# Patient Record
Sex: FEMALE | Race: ASIAN | Employment: PART TIME | URBAN - METROPOLITAN AREA
[De-identification: names, ages, dates, MRNs, and addresses within clinical notes are randomized per-mention and may not be internally consistent; named-entity substitution may affect disease eponyms.]

---

## 2018-09-11 ENCOUNTER — HOSPITAL ENCOUNTER (OUTPATIENT)
Dept: MAMMOGRAPHY | Age: 48
Discharge: HOME | End: 2018-09-11

## 2018-09-11 DIAGNOSIS — Z12.31 VISIT FOR SCREENING MAMMOGRAM: ICD-10-CM

## 2018-10-04 ENCOUNTER — HOSPITAL ENCOUNTER (OUTPATIENT)
Dept: WOMENS IMAGING | Age: 48
Discharge: HOME | End: 2018-10-04
Payer: COMMERCIAL

## 2018-10-04 ENCOUNTER — HOSPITAL ENCOUNTER (OUTPATIENT)
Dept: ULTRASOUND IMAGING | Age: 48
Discharge: HOME | End: 2018-10-04
Payer: COMMERCIAL

## 2018-10-04 DIAGNOSIS — R92.8 ABNORMAL FINDING ON MAMMOGRAPHY: ICD-10-CM

## 2018-10-04 PROCEDURE — G0279 TOMOSYNTHESIS, MAMMO: HCPCS

## 2018-10-04 PROCEDURE — 76642 ULTRASOUND BREAST LIMITED: CPT

## 2021-10-22 ENCOUNTER — HOSPITAL ENCOUNTER (OUTPATIENT)
Dept: WOMENS IMAGING | Age: 51
Discharge: HOME | End: 2021-10-22
Payer: COMMERCIAL

## 2021-10-22 VITALS — HEIGHT: 62 IN | WEIGHT: 162 LBS | BODY MASS INDEX: 29.81 KG/M2

## 2021-10-22 DIAGNOSIS — R92.8 ABNORMAL MAMMOGRAM OF BOTH BREASTS: ICD-10-CM

## 2021-10-22 PROCEDURE — G0279 TOMOSYNTHESIS, MAMMO: HCPCS

## 2023-09-21 ENCOUNTER — OFFICE VISIT (OUTPATIENT)
Dept: FAMILY MEDICINE CLINIC | Age: 53
End: 2023-09-21
Payer: COMMERCIAL

## 2023-09-21 VITALS
DIASTOLIC BLOOD PRESSURE: 74 MMHG | OXYGEN SATURATION: 98 % | HEIGHT: 65 IN | SYSTOLIC BLOOD PRESSURE: 112 MMHG | TEMPERATURE: 98.1 F | BODY MASS INDEX: 27.32 KG/M2 | HEART RATE: 81 BPM | WEIGHT: 164 LBS

## 2023-09-21 DIAGNOSIS — Z01.419 WELL WOMAN EXAM WITH ROUTINE GYNECOLOGICAL EXAM: ICD-10-CM

## 2023-09-21 DIAGNOSIS — Z00.00 ANNUAL PHYSICAL EXAM: Primary | ICD-10-CM

## 2023-09-21 DIAGNOSIS — Z12.31 ENCOUNTER FOR SCREENING MAMMOGRAM FOR MALIGNANT NEOPLASM OF BREAST: ICD-10-CM

## 2023-09-21 PROBLEM — E55.9 VITAMIN D DEFICIENCY: Status: ACTIVE | Noted: 2017-08-18

## 2023-09-21 PROCEDURE — 90674 CCIIV4 VAC NO PRSV 0.5 ML IM: CPT | Performed by: FAMILY MEDICINE

## 2023-09-21 PROCEDURE — 90472 IMMUNIZATION ADMIN EACH ADD: CPT | Performed by: FAMILY MEDICINE

## 2023-09-21 PROCEDURE — 90715 TDAP VACCINE 7 YRS/> IM: CPT | Performed by: FAMILY MEDICINE

## 2023-09-21 PROCEDURE — 99396 PREV VISIT EST AGE 40-64: CPT | Performed by: FAMILY MEDICINE

## 2023-09-21 PROCEDURE — 90471 IMMUNIZATION ADMIN: CPT | Performed by: FAMILY MEDICINE

## 2023-09-21 RX ORDER — IBUPROFEN 800 MG/1
1 TABLET ORAL 3 TIMES DAILY PRN
COMMUNITY
Start: 2021-10-27

## 2023-09-21 RX ORDER — CETIRIZINE HYDROCHLORIDE 10 MG/1
10 TABLET ORAL DAILY
COMMUNITY

## 2023-09-21 RX ORDER — FERROUS SULFATE 325(65) MG
325 TABLET ORAL DAILY
COMMUNITY

## 2023-09-21 RX ORDER — DIPHENHYDRAMINE HCL 25 MG
25 TABLET ORAL 2 TIMES DAILY
COMMUNITY

## 2023-09-21 SDOH — ECONOMIC STABILITY: FOOD INSECURITY: WITHIN THE PAST 12 MONTHS, YOU WORRIED THAT YOUR FOOD WOULD RUN OUT BEFORE YOU GOT MONEY TO BUY MORE.: NEVER TRUE

## 2023-09-21 SDOH — ECONOMIC STABILITY: INCOME INSECURITY: HOW HARD IS IT FOR YOU TO PAY FOR THE VERY BASICS LIKE FOOD, HOUSING, MEDICAL CARE, AND HEATING?: NOT HARD AT ALL

## 2023-09-21 SDOH — ECONOMIC STABILITY: FOOD INSECURITY: WITHIN THE PAST 12 MONTHS, THE FOOD YOU BOUGHT JUST DIDN'T LAST AND YOU DIDN'T HAVE MONEY TO GET MORE.: NEVER TRUE

## 2023-09-21 SDOH — ECONOMIC STABILITY: HOUSING INSECURITY
IN THE LAST 12 MONTHS, WAS THERE A TIME WHEN YOU DID NOT HAVE A STEADY PLACE TO SLEEP OR SLEPT IN A SHELTER (INCLUDING NOW)?: NO

## 2023-09-21 ASSESSMENT — PATIENT HEALTH QUESTIONNAIRE - PHQ9
2. FEELING DOWN, DEPRESSED OR HOPELESS: 0
SUM OF ALL RESPONSES TO PHQ QUESTIONS 1-9: 0
SUM OF ALL RESPONSES TO PHQ9 QUESTIONS 1 & 2: 0
1. LITTLE INTEREST OR PLEASURE IN DOING THINGS: 0
SUM OF ALL RESPONSES TO PHQ QUESTIONS 1-9: 0

## 2023-09-22 DIAGNOSIS — Z00.00 ANNUAL PHYSICAL EXAM: ICD-10-CM

## 2023-09-22 LAB
ALBUMIN SERPL-MCNC: 4.5 G/DL (ref 3.4–5)
ALBUMIN/GLOB SERPL: 1.7 {RATIO} (ref 1.1–2.2)
ALP SERPL-CCNC: 53 U/L (ref 40–129)
ALT SERPL-CCNC: 19 U/L (ref 10–40)
ANION GAP SERPL CALCULATED.3IONS-SCNC: 11 MMOL/L (ref 3–16)
AST SERPL-CCNC: 19 U/L (ref 15–37)
BASOPHILS # BLD: 0.1 K/UL (ref 0–0.2)
BASOPHILS NFR BLD: 0.9 %
BILIRUB SERPL-MCNC: 0.3 MG/DL (ref 0–1)
BUN SERPL-MCNC: 11 MG/DL (ref 7–20)
CALCIUM SERPL-MCNC: 9.2 MG/DL (ref 8.3–10.6)
CHLORIDE SERPL-SCNC: 105 MMOL/L (ref 99–110)
CHOLEST SERPL-MCNC: 234 MG/DL (ref 0–199)
CO2 SERPL-SCNC: 26 MMOL/L (ref 21–32)
CREAT SERPL-MCNC: 0.7 MG/DL (ref 0.6–1.1)
DEPRECATED RDW RBC AUTO: 14.5 % (ref 12.4–15.4)
EOSINOPHIL # BLD: 0.4 K/UL (ref 0–0.6)
EOSINOPHIL NFR BLD: 6.7 %
GFR SERPLBLD CREATININE-BSD FMLA CKD-EPI: >60 ML/MIN/{1.73_M2}
GLUCOSE SERPL-MCNC: 81 MG/DL (ref 70–99)
HCT VFR BLD AUTO: 33.5 % (ref 36–48)
HDLC SERPL-MCNC: 67 MG/DL (ref 40–60)
HGB BLD-MCNC: 11.1 G/DL (ref 12–16)
HPV HR 12 DNA SPEC QL NAA+PROBE: NOT DETECTED
HPV16 DNA SPEC QL NAA+PROBE: NOT DETECTED
HPV16+18+H RISK 12 DNA SPEC-IMP: NORMAL
HPV18 DNA SPEC QL NAA+PROBE: NOT DETECTED
LDLC SERPL CALC-MCNC: 148 MG/DL
LYMPHOCYTES # BLD: 1.6 K/UL (ref 1–5.1)
LYMPHOCYTES NFR BLD: 26.3 %
MCH RBC QN AUTO: 29 PG (ref 26–34)
MCHC RBC AUTO-ENTMCNC: 33.1 G/DL (ref 31–36)
MCV RBC AUTO: 87.6 FL (ref 80–100)
MONOCYTES # BLD: 0.4 K/UL (ref 0–1.3)
MONOCYTES NFR BLD: 6.3 %
NEUTROPHILS # BLD: 3.7 K/UL (ref 1.7–7.7)
NEUTROPHILS NFR BLD: 59.8 %
PLATELET # BLD AUTO: 305 K/UL (ref 135–450)
PMV BLD AUTO: 7.9 FL (ref 5–10.5)
POTASSIUM SERPL-SCNC: 4.2 MMOL/L (ref 3.5–5.1)
PROT SERPL-MCNC: 7.1 G/DL (ref 6.4–8.2)
RBC # BLD AUTO: 3.82 M/UL (ref 4–5.2)
SODIUM SERPL-SCNC: 142 MMOL/L (ref 136–145)
TRIGL SERPL-MCNC: 97 MG/DL (ref 0–150)
VLDLC SERPL CALC-MCNC: 19 MG/DL
WBC # BLD AUTO: 6.1 K/UL (ref 4–11)

## 2023-10-30 ENCOUNTER — OFFICE VISIT (OUTPATIENT)
Dept: FAMILY MEDICINE CLINIC | Age: 53
End: 2023-10-30
Payer: COMMERCIAL

## 2023-10-30 VITALS
HEART RATE: 82 BPM | TEMPERATURE: 97.2 F | BODY MASS INDEX: 27.19 KG/M2 | SYSTOLIC BLOOD PRESSURE: 102 MMHG | HEIGHT: 65 IN | WEIGHT: 163.2 LBS | OXYGEN SATURATION: 98 % | DIASTOLIC BLOOD PRESSURE: 71 MMHG

## 2023-10-30 DIAGNOSIS — G89.29 CHRONIC SI JOINT PAIN: ICD-10-CM

## 2023-10-30 DIAGNOSIS — M53.3 CHRONIC SI JOINT PAIN: ICD-10-CM

## 2023-10-30 DIAGNOSIS — Z12.11 SCREENING FOR COLON CANCER: ICD-10-CM

## 2023-10-30 DIAGNOSIS — J02.0 STREP PHARYNGITIS: Primary | ICD-10-CM

## 2023-10-30 LAB
INFLUENZA A ANTIBODY: NEGATIVE
INFLUENZA B ANTIBODY: NEGATIVE
Lab: NORMAL
QC PASS/FAIL: NORMAL
S PYO AG THROAT QL: POSITIVE
SARS-COV-2 RDRP RESP QL NAA+PROBE: NEGATIVE

## 2023-10-30 PROCEDURE — 99214 OFFICE O/P EST MOD 30 MIN: CPT | Performed by: FAMILY MEDICINE

## 2023-10-30 PROCEDURE — 87635 SARS-COV-2 COVID-19 AMP PRB: CPT | Performed by: FAMILY MEDICINE

## 2023-10-30 PROCEDURE — 87880 STREP A ASSAY W/OPTIC: CPT | Performed by: FAMILY MEDICINE

## 2023-10-30 PROCEDURE — 87804 INFLUENZA ASSAY W/OPTIC: CPT | Performed by: FAMILY MEDICINE

## 2023-10-30 RX ORDER — BENZONATATE 200 MG/1
200 CAPSULE ORAL 3 TIMES DAILY PRN
Qty: 30 CAPSULE | Refills: 0 | Status: SHIPPED | OUTPATIENT
Start: 2023-10-30 | End: 2023-11-06

## 2023-10-30 RX ORDER — AMOXICILLIN 500 MG/1
500 CAPSULE ORAL 2 TIMES DAILY
Qty: 20 CAPSULE | Refills: 0 | Status: SHIPPED | OUTPATIENT
Start: 2023-10-30 | End: 2023-11-09

## 2023-10-31 NOTE — PROGRESS NOTES
Chief Complaint: Cough, Fever, Pharyngitis, and Fatigue       HPI:  Jah Quiroz is a 47 y.o. female here with c/o cough sore throat ongoing since 3 to 4 days. Denies any fever. But complains of her throat hurting a lot. She is due for screening for colon cancer. She does not prefer to get the colonoscopy done. She has chronic pain in her lower back for which she was seeing Dr. Dm Honeycutt. And she was diagnosed of sacroiliac joint dysfunction with pain in her sacrum. She was getting epidural injection. In the recent past has got epidural steroid injection. In the past it has helped currently not much. Her last injection was in February 2023. She wanted to follow-up with orthopedics within the [facility]. ROS:  Constitutional: Negative   HENT: As mentioned above  Eyes: Negative for photophobia, discharge, redness and visual disturbance. Respiratory: Cough  Cardiovascular: Negative for chest pain, palpitations and leg swelling. Gastrointestinal: Negative for abdominal pain, blood in stool, constipation, diarrhea, nausea and vomiting. Genitourinary: Negative   Musculoskeletal: As mentioned above  Skin: Negative    Patient's problem list, medications, allergies, past medical, surgical, social and family histories were reviewed and updated as appropriate.      Current Outpatient Medications   Medication Sig Dispense Refill    benzonatate (TESSALON) 200 MG capsule Take 1 capsule by mouth 3 times daily as needed for Cough 30 capsule 0    amoxicillin (AMOXIL) 500 MG capsule Take 1 capsule by mouth 2 times daily for 10 days 20 capsule 0    ibuprofen (ADVIL;MOTRIN) 800 MG tablet Take 1 tablet by mouth 3 times daily as needed      cetirizine (ZYRTEC) 10 MG tablet Take 1 tablet by mouth daily      ferrous sulfate (IRON 325) 325 (65 Fe) MG tablet Take 1 tablet by mouth daily      diphenhydrAMINE (BENADRYL) 25 MG tablet Take 1 tablet by mouth 2 times daily       No current facility-administered medications for

## 2023-11-03 ENCOUNTER — HOSPITAL ENCOUNTER (OUTPATIENT)
Age: 53
Discharge: HOME | End: 2023-11-03
Payer: COMMERCIAL

## 2023-11-03 ENCOUNTER — OFFICE VISIT (OUTPATIENT)
Dept: FAMILY MEDICINE CLINIC | Age: 53
End: 2023-11-03
Payer: COMMERCIAL

## 2023-11-03 ENCOUNTER — HOSPITAL ENCOUNTER (OUTPATIENT)
Dept: GENERAL RADIOLOGY | Age: 53
Discharge: HOME | End: 2023-11-03
Payer: COMMERCIAL

## 2023-11-03 ENCOUNTER — TELEPHONE (OUTPATIENT)
Dept: FAMILY MEDICINE CLINIC | Age: 53
End: 2023-11-03

## 2023-11-03 VITALS
BODY MASS INDEX: 27.32 KG/M2 | HEART RATE: 81 BPM | HEIGHT: 65 IN | SYSTOLIC BLOOD PRESSURE: 110 MMHG | DIASTOLIC BLOOD PRESSURE: 74 MMHG | TEMPERATURE: 99 F | WEIGHT: 164 LBS | OXYGEN SATURATION: 100 %

## 2023-11-03 DIAGNOSIS — R05.1 ACUTE COUGH: ICD-10-CM

## 2023-11-03 DIAGNOSIS — R05.1 ACUTE COUGH: Primary | ICD-10-CM

## 2023-11-03 PROCEDURE — 99213 OFFICE O/P EST LOW 20 MIN: CPT | Performed by: FAMILY MEDICINE

## 2023-11-03 PROCEDURE — 71046 X-RAY EXAM CHEST 2 VIEWS: CPT

## 2023-11-03 RX ORDER — GUAIFENESIN AND CODEINE PHOSPHATE 100; 10 MG/5ML; MG/5ML
5-10 SOLUTION ORAL NIGHTLY PRN
Qty: 118 ML | Refills: 0 | Status: SHIPPED | OUTPATIENT
Start: 2023-11-03 | End: 2023-11-08

## 2023-11-03 RX ORDER — ALBUTEROL SULFATE 90 UG/1
2 AEROSOL, METERED RESPIRATORY (INHALATION) 4 TIMES DAILY PRN
Qty: 18 G | Refills: 0 | Status: SHIPPED | OUTPATIENT
Start: 2023-11-03

## 2023-11-03 NOTE — TELEPHONE ENCOUNTER
Patient called back was not sure of where to send the medication, I informed that patient that we can send to [address] [place] [address].   Please look over and send

## 2023-11-03 NOTE — TELEPHONE ENCOUNTER
Pharmacy called in stating that they are unable to fill the Magic Mouthwash because they no longer compound any medications.   Called to ask patient where the medication needs to be send and they are going to call the office back and let office know where to send the medication

## 2023-11-03 NOTE — TELEPHONE ENCOUNTER
Patient was seen recently for strep and was prescribed antibiotics. Unfortunately, antibiotics are not seeming to help. Requesting assistance from provider.

## 2023-11-08 ENCOUNTER — OFFICE VISIT (OUTPATIENT)
Dept: ORTHOPEDIC SURGERY | Age: 53
End: 2023-11-08
Payer: COMMERCIAL

## 2023-11-08 VITALS — BODY MASS INDEX: 27.32 KG/M2 | HEIGHT: 65 IN | WEIGHT: 164 LBS

## 2023-11-08 DIAGNOSIS — M53.3 COCCYGODYNIA: ICD-10-CM

## 2023-11-08 DIAGNOSIS — M53.3 COCCYGEAL PAIN, CHRONIC: ICD-10-CM

## 2023-11-08 DIAGNOSIS — G89.29 COCCYGEAL PAIN, CHRONIC: ICD-10-CM

## 2023-11-08 DIAGNOSIS — M54.50 LOW BACK PAIN, UNSPECIFIED BACK PAIN LATERALITY, UNSPECIFIED CHRONICITY, UNSPECIFIED WHETHER SCIATICA PRESENT: Primary | ICD-10-CM

## 2023-11-08 PROCEDURE — 99204 OFFICE O/P NEW MOD 45 MIN: CPT | Performed by: INTERNAL MEDICINE

## 2023-11-08 NOTE — PROGRESS NOTES
INDICATION: Chronic left-sided low back pain,     COMPARISON: None     FINDINGS:     Vertebral body heights are normal. Osseous structures intact. Lumbosacral alignment is anatomic. Lumbar disc space heights are normal.       IMPRESSION:     No radiographic abnormality     Signed By: Sanford Zeng MD  Exam End: 08/24/18 11:59    Specimen Collected: 08/24/18 12:02 Last Resulted: 08/24/18 12:03   Received From: [facility]  Result Received: 10/30/23 10:40           Assessment   Impression: . Encounter Diagnoses   Name Primary? Coccygodynia     Coccygeal pain, chronic     Low back pain, unspecified back pain laterality, unspecified chronicity, unspecified whether sciatica present Yes              Plan:     Hold any further spine intervention/sacrococcygeal intervention until advanced imaging is obtained  MRI scan sacrum/coccyx rule out osteomyelitis rule out stress reaction/high-grade arthropathy  Consider ganglion of impar nerve block pending results of MRI  Surgical consultation for coccygectomy unlikely necessary      The nature of the finding, probable diagnosis and likely treatment was thoroughly discussed with the patient. The options, risks, complications, alternative treatment as well as some of the differential diagnosis was discussed. The patient was thoroughly informed and all questions were answered. the patient indicated understanding and satisfaction with the discussion.       Orders:        Orders Placed This Encounter   Procedures    XR LUMBAR SPINE (2-3 VIEWS)     Standing Status:   Future     Number of Occurrences:   1     Standing Expiration Date:   11/7/2024     Order Specific Question:   Reason for exam:     Answer:   pain    MRI SACRUM COCCYX WO CONTRAST     Standing Status:   Future     Standing Expiration Date:   11/8/2024     Scheduling Instructions:      Markell Teran     Order Specific Question:   Reason for exam:     Answer:   coccygodynia, r/o osteomylitis/ stress fx

## 2023-11-15 ENCOUNTER — TELEPHONE (OUTPATIENT)
Dept: ORTHOPEDIC SURGERY | Age: 53
End: 2023-11-15

## 2023-11-15 NOTE — TELEPHONE ENCOUNTER
[phone] need clinical notes in regards to sacrum fax # [phone]

## 2023-11-29 NOTE — TELEPHONE ENCOUNTER
We do not fax clinical notes or insurance to [place], as [address] [place] [address] completes the [place]. MRI denied, as pt's insurance benefits are not active until 1/1/24.

## 2024-02-16 ENCOUNTER — TELEPHONE (OUTPATIENT)
Dept: ORTHOPEDIC SURGERY | Age: 54
End: 2024-02-16

## 2024-02-20 DIAGNOSIS — G89.29 COCCYGEAL PAIN, CHRONIC: ICD-10-CM

## 2024-02-20 DIAGNOSIS — M53.3 COCCYGODYNIA: Primary | ICD-10-CM

## 2024-02-20 DIAGNOSIS — M53.3 COCCYGEAL PAIN, CHRONIC: ICD-10-CM

## 2024-02-20 NOTE — TELEPHONE ENCOUNTER
New MRI order was placed and faxed to [facility].  Previous MRI denied due to lack of insurance coverage (began anew on 1/1/24).

## 2024-02-22 NOTE — TELEPHONE ENCOUNTER
S/W pt today to confirm that the insurance was still the [employer] card that is scanned into media, and pt states that it is.  I have message the [employer] department to let them know.

## 2024-03-19 ENCOUNTER — TELEPHONE (OUTPATIENT)
Dept: ORTHOPEDIC SURGERY | Age: 54
End: 2024-03-19

## 2024-03-22 NOTE — TELEPHONE ENCOUNTER
General Question     Subject: Patient is calling to ck on her Mri Results to see if the office has received her results. she just need a call back.   Patient Ally Chandler   Contact Number: [phone]     
Patient has been contacted and appt has been scheduled.  
Test Results     Type of Test: MRI     Location of Test: [facility]  Patient Contact Number: [phone]      Pt STILL WAITING FOR CONFIRMATION THAT HER MRI RESULTS HAVE BEEN RCV'D.     PLEASE CALL ASAP.   
Calm/Playful

## 2024-03-26 ENCOUNTER — OFFICE VISIT (OUTPATIENT)
Dept: ORTHOPEDIC SURGERY | Age: 54
End: 2024-03-26
Payer: COMMERCIAL

## 2024-03-26 VITALS — BODY MASS INDEX: 27.32 KG/M2 | WEIGHT: 164 LBS | HEIGHT: 65 IN

## 2024-03-26 DIAGNOSIS — G89.29 COCCYGEAL PAIN, CHRONIC: ICD-10-CM

## 2024-03-26 DIAGNOSIS — M53.3 COCCYGODYNIA: Primary | ICD-10-CM

## 2024-03-26 DIAGNOSIS — M53.3 COCCYGEAL PAIN, CHRONIC: ICD-10-CM

## 2024-03-26 PROCEDURE — 99214 OFFICE O/P EST MOD 30 MIN: CPT | Performed by: INTERNAL MEDICINE

## 2024-03-26 NOTE — PROGRESS NOTES
Chief Complaint:   Chief Complaint   Patient presents with    Lower Back Pain     Lumbar - Results of MRI today. No change in her pain.          History of Present Illness:       Patient is a 53 y.o. female returns follow up for the above complaint. The patient was last seen approximately 4 monthsago and lost to follow-up. The symptoms show no change since the last visit. The patient has had further testing for the problem.    MRI sacrum/coccyx completed in the interim. conclusions 3/5/2024: [facility] imaging mild degenerative arthrosis of the sacroiliac joints without evidence of sacroiliitis or bone lesion.    Sacrococcygeal: leg pain 110:0. Pain at the coccyx is aching or sharp in quality.  Symptoms are unchanged in quality or character.    Pain levels:9.  Symptoms are equally painful and soft or hard surfaces.    She has been diligent with use of pillow orthosis despite this symptoms remain problematic      The patient denies new onset or progressive weakness of the lower extremities.  The patient denies new onset bowel or bladder dysfunction.    No reliance on NSAIDs or other analgesics       Past Medical History:        Past Medical History:   Diagnosis Date    Joint pain     Seasonal allergies         Present Medications:         Current Outpatient Medications   Medication Sig Dispense Refill    albuterol sulfate HFA (VENTOLIN HFA) 108 (90 Base) MCG/ACT inhaler Inhale 2 puffs into the lungs 4 times daily as needed for Wheezing 18 g 0    Magic Mouthwash (MIRACLE MOUTHWASH) Swish and spit 5 mLs 4 times daily as needed for Irritation or Pain 200 mL 0    ibuprofen (ADVIL;MOTRIN) 800 MG tablet Take 1 tablet by mouth 3 times daily as needed      cetirizine (ZYRTEC) 10 MG tablet Take 1 tablet by mouth daily      ferrous sulfate (IRON 325) 325 (65 Fe) MG tablet Take 1 tablet by mouth daily       No current facility-administered medications for this visit.         Allergies:      No Known Allergies        Review of

## 2024-03-28 LAB — NONINV COLON CA DNA+OCC BLD SCRN STL QL: NEGATIVE

## 2024-04-04 ENCOUNTER — HOSPITAL ENCOUNTER (OUTPATIENT)
Dept: INTERVENTIONAL RADIOLOGY/VASCULAR | Age: 54
Discharge: HOME | End: 2024-04-04

## 2024-04-04 ENCOUNTER — HOSPITAL ENCOUNTER (OUTPATIENT)
Dept: CT IMAGING | Age: 54
Discharge: HOME | End: 2024-04-04
Attending: INTERNAL MEDICINE
Payer: COMMERCIAL

## 2024-04-04 VITALS
RESPIRATION RATE: 16 BRPM | HEIGHT: 65 IN | TEMPERATURE: 98.1 F | SYSTOLIC BLOOD PRESSURE: 117 MMHG | WEIGHT: 170 LBS | OXYGEN SATURATION: 100 % | BODY MASS INDEX: 28.32 KG/M2 | DIASTOLIC BLOOD PRESSURE: 72 MMHG | HEART RATE: 63 BPM

## 2024-04-04 DIAGNOSIS — M53.3 COCCYGODYNIA: ICD-10-CM

## 2024-04-04 DIAGNOSIS — M53.3 COCCYGEAL PAIN, CHRONIC: ICD-10-CM

## 2024-04-04 DIAGNOSIS — M54.50 BACK PAIN AT L4-L5 LEVEL: ICD-10-CM

## 2024-04-04 DIAGNOSIS — G89.29 COCCYGEAL PAIN, CHRONIC: ICD-10-CM

## 2024-04-04 PROCEDURE — 77003 FLUOROGUIDE FOR SPINE INJECT: CPT

## 2024-04-04 PROCEDURE — 6360000002 HC RX W HCPCS

## 2024-04-04 PROCEDURE — 64999 UNLISTED PX NERVOUS SYSTEM: CPT

## 2024-04-04 PROCEDURE — 2709999900 HC NON-CHARGEABLE SUPPLY

## 2024-04-04 ASSESSMENT — PAIN DESCRIPTION - FREQUENCY: FREQUENCY: CONTINUOUS

## 2024-04-04 ASSESSMENT — PAIN DESCRIPTION - LOCATION: LOCATION: BACK

## 2024-04-04 ASSESSMENT — PAIN DESCRIPTION - PAIN TYPE: TYPE: ACUTE PAIN

## 2024-04-04 ASSESSMENT — PAIN DESCRIPTION - ORIENTATION: ORIENTATION: LOWER

## 2024-04-04 ASSESSMENT — PAIN DESCRIPTION - ONSET: ONSET: ON-GOING

## 2024-04-04 ASSESSMENT — PAIN - FUNCTIONAL ASSESSMENT: PAIN_FUNCTIONAL_ASSESSMENT: PREVENTS OR INTERFERES SOME ACTIVE ACTIVITIES AND ADLS

## 2024-04-04 ASSESSMENT — PAIN DESCRIPTION - DESCRIPTORS: DESCRIPTORS: ACHING

## 2024-04-04 ASSESSMENT — PAIN SCALES - GENERAL: PAINLEVEL_OUTOF10: 9

## 2024-04-04 NOTE — DISCHARGE INSTRUCTIONS
Ganglion of Impar Block with Steroid Injection  Patient Discharge Instructions      When You Get Home    You should not drive the day of the procedure.  You may experience leg weakness during the first 24 hours following the procedure.  To prevent yourself from falling, it is important to have someone help you walk.  However, you do not need to stay in bed when you get home.  In fact, it is best to walk around if you feel up to it, but you will need assistance during the first 24 hours following the block/steroid injection.   Even if you feel better right away, avoid activities that may strain your back.      Keep in mind that some patients may feel increased pain for the first 24 hours.  You should start feeling some pain relief 2-3 days following the injection.  This is because the steroid will start working within three days of the injection with maximal effect by one week.  At that time, we will evaluate your pain level to determine the need for another steroid injection.    Remove bandaid(s) within 24 hours.       When to Call Your Doctor    Call right away if you notice any of the following symptoms:    Severe pain or headache;  Fever or chills;  Redness or swelling around the injection site.  Loss of bladder or bowel control.          You may contact [facility] for any questions or problems that may occur at [phone] during the hours of 9am-4pm Monday-Friday, or the hospital  after hours at [phone], to have the interventional radiologist on call paged.      [facility]  Cardiovascular Special Procedures  General Discharge Instructions    PROCEDURE: Ganglion of Impar Block with Steroid Injection    ____ You may be drowsy or lightheaded after receiving sedation. DO NOT operate a vehicle (automobile, bicycle, motorcycle, machinery, or power tools), make any important decisions or sign any important/legal documents, or drink alcoholic beverages for the next 24

## 2024-04-04 NOTE — PROGRESS NOTES
Patient/family given discharge instructions. Patient/family verbalize understanding of discharge instructions, all questions addressed, written copy given to patient/family. Pt transferred to vehicle via wheelchair to be discharged home with family.

## 2024-04-04 NOTE — PROGRESS NOTES
Cath Lab Pre Procedure Flowsheet    Plan of Care:     Hemodynamics and cardiac rhythm will remain stable.   Comfort level will be maintained.   Respiratory function will remain adequate.   Pt/family will verbalize understanding of the procedure.   Procedure will be tolerated without complications.   Patient will recover from procedure without complications.   ID armband on patient and identification verified.   Informed consent obtained.   Non invasive blood pressure cuff applied, monitoring initiated.   EKG pads and pulse oximeter applied, monitoring initiated.   Instructions given. Patient and / or family verbalize understanding.   H&P will be documented by physician in [facility].     Pre-procedure:    NPO Status: No. Patient was told not to eat/drink 4 hours before the procedure.     Contrast / IV Dye Allergy: No    Pregnancy Test: No.    Prep Sites: N/A    Thomas's Test: N/A    Pulses: Bilat Radial 2, Bilat DP    Anticoagulants: None.     Antiplatelets: None.     Chief Complaint:   Low back pain    Diabetic: No    Pre EKG Rhythm: Sinus Rhythm    Pre SBP: 117    IV access: No IV access needed for this procedure.    Pre-procedure blood work collected by: No lab work needed for this procedure.     NIH Scale: N/A

## 2024-04-15 SDOH — HEALTH STABILITY: PHYSICAL HEALTH: ON AVERAGE, HOW MANY MINUTES DO YOU ENGAGE IN EXERCISE AT THIS LEVEL?: 40 MIN

## 2024-04-15 SDOH — HEALTH STABILITY: PHYSICAL HEALTH: ON AVERAGE, HOW MANY DAYS PER WEEK DO YOU ENGAGE IN MODERATE TO STRENUOUS EXERCISE (LIKE A BRISK WALK)?: 3 DAYS

## 2024-04-16 ENCOUNTER — OFFICE VISIT (OUTPATIENT)
Dept: ORTHOPEDIC SURGERY | Age: 54
End: 2024-04-16
Payer: COMMERCIAL

## 2024-04-16 VITALS — BODY MASS INDEX: 28.07 KG/M2 | WEIGHT: 168.7 LBS

## 2024-04-16 DIAGNOSIS — M17.0 PRIMARY OSTEOARTHRITIS OF BOTH KNEES: ICD-10-CM

## 2024-04-16 DIAGNOSIS — M25.562 LEFT KNEE PAIN, UNSPECIFIED CHRONICITY: ICD-10-CM

## 2024-04-16 DIAGNOSIS — M22.41 CHONDROMALACIA OF RIGHT PATELLA: ICD-10-CM

## 2024-04-16 DIAGNOSIS — M25.561 RIGHT KNEE PAIN, UNSPECIFIED CHRONICITY: Primary | ICD-10-CM

## 2024-04-16 DIAGNOSIS — M22.42 CHONDROMALACIA OF LEFT PATELLA: ICD-10-CM

## 2024-04-16 PROCEDURE — 99214 OFFICE O/P EST MOD 30 MIN: CPT | Performed by: INTERNAL MEDICINE

## 2024-04-16 RX ORDER — MELOXICAM 15 MG/1
15 TABLET ORAL DAILY
Qty: 30 TABLET | Refills: 1 | Status: SHIPPED | OUTPATIENT
Start: 2024-04-16

## 2024-04-16 NOTE — PROGRESS NOTES
Chief Complaint:   Chief Complaint   Patient presents with    Knee Pain     NP B KNEES. Going on for years. No known injury, sometimes takes OTC pain relievers which temporarily help. Going upstairs will bother her, knees hurt pretty consistently.          History of Present Illness:       Patient is a 53 y.o. female presents with the above complaint. The symptoms began  several  yearsago and started without an injury. The patient describes a sharp, aching pain that does not radiate.  The symptoms are intermittent  and are are worsening since the onset.      Pain localizes to the front side of the knee and  does seems to follow a typical patella femoral provacative pattern.  There are not mechanical symptoms that suggest meniscal injury.  The patient denies subjective instability about the knee and denies new onset weakness of the lower extremity.    Pain level 7    The patient denies a pattern of activity related swelling.      Treatment to date: NSAIDS OTC with mild improvement  Therapy remote past with mild improvement.     There is no prior history of knee trauma. Workup has included  none    There is no prior history of autoimmune disease, inflammatory arthropathy, or crystal arthropathy.               Past Medical History:        Past Medical History:   Diagnosis Date    Joint pain     Seasonal allergies          Past Surgical History:   Procedure Laterality Date    TUBAL LIGATION  1997         Present Medications:         Current Outpatient Medications   Medication Sig Dispense Refill    albuterol sulfate HFA (VENTOLIN HFA) 108 (90 Base) MCG/ACT inhaler Inhale 2 puffs into the lungs 4 times daily as needed for Wheezing 18 g 0    Magic Mouthwash (MIRACLE MOUTHWASH) Swish and spit 5 mLs 4 times daily as needed for Irritation or Pain 200 mL 0    ibuprofen (ADVIL;MOTRIN) 800 MG tablet Take 1 tablet by mouth 3 times daily as needed      cetirizine (ZYRTEC) 10 MG tablet Take 1 tablet by mouth daily      ferrous 
Question:   Reason for exam:     Answer:   PAIN    [facility] Physical Therapy  [facility]     Referral Priority:   Routine     Referral Type:   Eval and Treat     Referral Reason:   Specialty Services Required     Requested Specialty:   Physical Therapist     Number of Visits Requested:   1           Other Outside Imaging and Testing Personally Reviewed:    XR KNEE LEFT (MIN 4 VIEWS)    Result Date: 4/16/2024  Radiology exam is complete. No Radiologist dictation. Please follow up with ordering provider.     XR KNEE RIGHT (MIN 4 VIEWS)    Result Date: 4/16/2024  Radiology exam is complete. No Radiologist dictation. Please follow up with ordering provider.              Assessment   Impression: .    Encounter Diagnoses   Name Primary?    Primary osteoarthritis of both knees     Chondromalacia of left patella     Chondromalacia of right patella     Right knee pain, unspecified chronicity Yes    Left knee pain, unspecified chronicity               Plan:       start course of PT and transition to IHEP  Activity modification -patellofemoral precautions and caution against overuse  Medical pain management: NSAID: Meloxicam for 2 weeks.  Thereafter  Consider functional brace trial as needed  Consider HA therapy as needed she is not interested in this at this time    The nature of the finding, probable diagnosis and likely treatment was thoroughly discussed with the patient. The options, risks, complications, alternative treatment as well as some of the differential diagnosis was discussed. The patient was thoroughly informed and all questions were answered. the patient indicated understanding and satisfaction with the discussion.      Orders:        Orders Placed This Encounter   Procedures    XR KNEE LEFT (MIN 4 VIEWS)     Room 1     Order Specific Question:   Reason for exam:     Answer:   PAIN    XR KNEE RIGHT (MIN 4 VIEWS)     Room 1     Order Specific Question:   Reason for exam:     Answer:   PAIN

## 2024-04-18 ENCOUNTER — HOSPITAL ENCOUNTER (OUTPATIENT)
Dept: PHYSICAL THERAPY | Age: 54
Setting detail: THERAPIES SERIES
Discharge: HOME | End: 2024-04-18
Payer: COMMERCIAL

## 2024-04-18 DIAGNOSIS — M62.81 QUADRICEPS WEAKNESS: ICD-10-CM

## 2024-04-18 DIAGNOSIS — M25.562 LEFT KNEE PAIN, UNSPECIFIED CHRONICITY: ICD-10-CM

## 2024-04-18 DIAGNOSIS — M25.561 RIGHT KNEE PAIN, UNSPECIFIED CHRONICITY: Primary | ICD-10-CM

## 2024-04-18 PROCEDURE — 97161 PT EVAL LOW COMPLEX 20 MIN: CPT | Performed by: PHYSICAL THERAPIST

## 2024-04-18 PROCEDURE — 97110 THERAPEUTIC EXERCISES: CPT | Performed by: PHYSICAL THERAPIST

## 2024-04-18 NOTE — PLAN OF CARE
of motion, maintain or improve muscular strength or increase flexibility, following either an injury or surgery.   (96844) HOME EXERCISE PROGRAM - Reviewed/Progressed HEP activities related to strengthening, flexibility, endurance, ROM performed to prevent loss of range of motion, maintain or improve muscular strength or increase flexibility, following either an injury or surgery.      GOALS     GOALS:  Patient stated goal: reduce pain  [] Progressing: [] Met: [] Not Met: [] Adjusted    Therapist goals for Patient:   Short Term Goals: To be achieved in: 2 weeks  1. Independent in HEP and progression per patient tolerance, in order to prevent re-injury.   [] Progressing: [] Met: [] Not Met: [] Adjusted  2. Patient will have a decrease in pain to <3/10 to facilitate improvement in movement, function, and ADLs as indicated by Functional Deficits.  [] Progressing: [] Met: [] Not Met: [] Adjusted    Long Term Goals: To be achieved in: 8 weeks  1. Disability index score of 20% or less for the WOMAC to assist with reaching prior level of function with activities such as standing at work without pain.  [] Progressing: [] Met: [] Not Met: [] Adjusted  2. Patient will demonstrate increased LE flexibility to max potential to allow for proper joint functioning to enable patient to walk without pain.   [] Progressing: [] Met: [] Not Met: [] Adjusted  3. Patient will demonstrate increased Strength of B quad to at least 4+/5 throughout without pain to allow for proper functional mobility to enable patient to return to sit to stand transitions without pain.   [] Progressing: [] Met: [] Not Met: [] Adjusted  4. Patient will return to reciprocal stairs without increased symptoms or restriction to enable patient to move throughout home without pain.   [] Progressing: [] Met: [] Not Met: [] Adjusted  5. Patient will return to walking 15+ minutes for exercise without increased symptoms.   [] Progressing: [] Met: [] Not Met: []

## 2024-05-01 ENCOUNTER — HOSPITAL ENCOUNTER (OUTPATIENT)
Dept: PHYSICAL THERAPY | Age: 54
Setting detail: THERAPIES SERIES
Discharge: HOME | End: 2024-05-01
Payer: COMMERCIAL

## 2024-05-01 PROCEDURE — 97110 THERAPEUTIC EXERCISES: CPT | Performed by: PHYSICAL THERAPIST

## 2024-05-01 PROCEDURE — 97140 MANUAL THERAPY 1/> REGIONS: CPT | Performed by: PHYSICAL THERAPIST

## 2024-05-01 NOTE — FLOWSHEET NOTE
[facility] - Outpatient Rehabilitation and Therapy [address], [place] [address] office: [phone] fax: [phone]         Physical Therapy: TREATMENT/PROGRESS NOTE   Patient: Ally Chandler (53 y.o. female)   Examination Date: 2024   :  1970 MRN: [id number]   Visit #: 2   Insurance Allowable Auth Needed   8 auth thru  [x]Yes    []No    Insurance: Payor: JAVY / Plan: JAVY PATHAK ANDERSON / Product Type: *No Product type* /   Insurance ID: [id number] - (Commercial)  Secondary Insurance (if applicable):    Treatment Diagnosis:     ICD-10-CM    1. Right knee pain, unspecified chronicity  M25.561       2. Left knee pain, unspecified chronicity  M25.562       3. Quadriceps weakness  M62.81          Medical Diagnosis:  Right knee pain, unspecified chronicity [M25.561]  Left knee pain, unspecified chronicity [M25.562]   Referring Physician: Jermaine Stroud*  PCP: Olga Blake MD     Plan of care signed (Y/N):     Date of Patient follow up with Physician:      Progress Report/POC: NO  POC update due: (10 visits /OR AUTH LIMITS, whichever is less)  2024                                             Precautions/ Contra-indications:           Latex allergy:  NO  Pacemaker:    NO  Contraindications for Manipulation: None  Date of Surgery: n/a  Other:    Red Flags:  None    C-SSRS Triggered by Intake questionnaire:   [x] No, Questionnaire did not trigger screening.   [] Yes, Patient intake triggered further evaluation      [] C-SSRS Screening completed  [] PCP notified via Plan of Care  [] Emergency services notified     Preferred Language for Healthcare:   [x] English       [] other:    SUBJECTIVE EXAMINATION     Patient stated complaint: Pt. States that she is feeling about the same. She continues to have constant pain in B anterior knees. Stairs and sit to stand transitions hurt the most. Pt. Reports completing HEP.        Test used Initial

## 2024-05-09 ENCOUNTER — HOSPITAL ENCOUNTER (OUTPATIENT)
Dept: PHYSICAL THERAPY | Age: 54
Setting detail: THERAPIES SERIES
Discharge: HOME | End: 2024-05-09
Payer: COMMERCIAL

## 2024-05-09 PROCEDURE — 97110 THERAPEUTIC EXERCISES: CPT | Performed by: PHYSICAL THERAPIST

## 2024-05-09 PROCEDURE — 97530 THERAPEUTIC ACTIVITIES: CPT | Performed by: PHYSICAL THERAPIST

## 2024-05-09 NOTE — FLOWSHEET NOTE
[facility] - Outpatient Rehabilitation and Therapy [address], [place] [address] office: [phone] fax: [phone]         Physical Therapy: TREATMENT/PROGRESS NOTE   Patient: Ally Chandler (53 y.o. female)   Examination Date: 2024   :  1970 MRN: [id number]   Visit #: 3   Insurance Allowable Auth Needed   8 auth thru  [x]Yes    []No    Insurance: Payor: JAVY / Plan: JAVY PATHAK ANDERSON / Product Type: *No Product type* /   Insurance ID: [id number] - (Commercial)  Secondary Insurance (if applicable):    Treatment Diagnosis:     ICD-10-CM    1. Right knee pain, unspecified chronicity  M25.561       2. Left knee pain, unspecified chronicity  M25.562       3. Quadriceps weakness  M62.81          Medical Diagnosis:  Right knee pain, unspecified chronicity [M25.561]  Left knee pain, unspecified chronicity [M25.562]   Referring Physician: Jermaine Stroud*  PCP: Olga Blake MD     Plan of care signed (Y/N):     Date of Patient follow up with Physician:      Progress Report/POC: NO  POC update due: (10 visits /OR AUTH LIMITS, whichever is less)  2024                                             Precautions/ Contra-indications:           Latex allergy:  NO  Pacemaker:    NO  Contraindications for Manipulation: None  Date of Surgery: n/a  Other:    Red Flags:  None    C-SSRS Triggered by Intake questionnaire:   [x] No, Questionnaire did not trigger screening.   [] Yes, Patient intake triggered further evaluation      [] C-SSRS Screening completed  [] PCP notified via Plan of Care  [] Emergency services notified     Preferred Language for Healthcare:   [x] English       [] other:    SUBJECTIVE EXAMINATION     Patient stated complaint: pt. Reports that her knees are feeling about the same. She continues to have pain in anterior knees, especially with stairs or sit to stand transitions. Pt. Notes that she tries to complete HEP throughout

## 2024-05-15 ENCOUNTER — HOSPITAL ENCOUNTER (OUTPATIENT)
Dept: PHYSICAL THERAPY | Age: 54
Setting detail: THERAPIES SERIES
Discharge: HOME | End: 2024-05-15
Payer: COMMERCIAL

## 2024-05-15 PROCEDURE — 97530 THERAPEUTIC ACTIVITIES: CPT | Performed by: PHYSICAL THERAPIST

## 2024-05-15 PROCEDURE — 97110 THERAPEUTIC EXERCISES: CPT | Performed by: PHYSICAL THERAPIST

## 2024-05-15 NOTE — FLOWSHEET NOTE
(10022)     Ultrasound (21204)    Group Therapy (05950)     Estim Attended (94934)    Canalith Repositioning (69292)     Other:    Other:    Total Timed Code Tx Minutes 42 3       Total Treatment Minutes 42        Charge Justification:  (67044) THERAPEUTIC EXERCISE - Provided verbal/tactile cueing for activities related to strengthening, flexibility, endurance, ROM performed to prevent loss of range of motion, maintain or improve muscular strength or increase flexibility, following either an injury or surgery.   (54801) HOME EXERCISE PROGRAM - Reviewed/Progressed HEP activities related to strengthening, flexibility, endurance, ROM performed to prevent loss of range of motion, maintain or improve muscular strength or increase flexibility, following either an injury or surgery.  (20085) THERAPEUTIC ACTIVITY - use of dynamic activities to improve functional performance. (Ex include squatting, ascending/descending stairs, walking, bending, lifting, catching, throwing, pushing, pulling, jumping.)  Direct, one on one contact, billed in 15-minute increments.      GOALS     GOALS:  Patient stated goal: reduce pain  [] Progressing: [] Met: [] Not Met: [] Adjusted    Therapist goals for Patient:   Short Term Goals: To be achieved in: 2 weeks  1. Independent in HEP and progression per patient tolerance, in order to prevent re-injury.   [] Progressing: [] Met: [] Not Met: [] Adjusted  2. Patient will have a decrease in pain to <3/10 to facilitate improvement in movement, function, and ADLs as indicated by Functional Deficits.  [] Progressing: [] Met: [] Not Met: [] Adjusted    Long Term Goals: To be achieved in: 8 weeks  1. Disability index score of 20% or less for the WOMAC to assist with reaching prior level of function with activities such as standing at work without pain.  [] Progressing: [] Met: [] Not Met: [] Adjusted  2. Patient will demonstrate increased LE flexibility to max potential to allow for proper joint

## 2024-05-22 ENCOUNTER — HOSPITAL ENCOUNTER (OUTPATIENT)
Dept: PHYSICAL THERAPY | Age: 54
Setting detail: THERAPIES SERIES
Discharge: HOME | End: 2024-05-22
Payer: COMMERCIAL

## 2024-05-22 PROCEDURE — 97530 THERAPEUTIC ACTIVITIES: CPT | Performed by: PHYSICAL THERAPIST

## 2024-05-22 PROCEDURE — 97110 THERAPEUTIC EXERCISES: CPT | Performed by: PHYSICAL THERAPIST

## 2024-05-22 NOTE — PLAN OF CARE
without increased symptoms or restriction to enable patient to move throughout home without pain.   [] Progressing: [] Met: [x] Not Met: [] Adjusted  5. Patient will return to walking 15+ minutes for exercise without increased symptoms.   [] Progressing: [] Met: [x] Not Met: [] Adjusted       Overall Progression Towards Functional goals/ Treatment Progress Update:  [] Patient is progressing as expected towards functional goals listed.    [x] Progression is slowed due to complexities/Impairments listed.  [] Progression has been slowed due to co-morbidities.  [] Plan just implemented, too soon (<30days) to assess goals progression   [] Goals require adjustment due to lack of progress  [] Patient is not progressing as expected and requires additional follow up with physician  [] Other:     TREATMENT PLAN     Frequency/Duration: 1-2x/week for 8 weeks for the following treatment interventions:    Interventions:  Therapeutic Exercise (19539) including: strength training, ROM, and functional mobility  Therapeutic Activities (70698) including: functional mobility training and education.  Neuromuscular Re-education (53694) activation and proprioception, including postural re-education.    Manual Therapy (08325) as indicated to include: Soft Tissue Mobilization  Patient education on progression of HEP    Plan: POC initiated as per evaluation continue to progress functional strengthening, pt. May benefit from follow up with MD    Electronically Signed by Haven Anderson, PT  Date: 05/22/2024     Note: Portions of this note have been templated and/or copied from initial evaluation, reassessments and prior notes for documentation efficiency.    Note: If patient does not return for scheduled/recommended follow up visits, this note will serve as a discharge from care along with the most recent update on progress.

## 2024-05-29 ENCOUNTER — APPOINTMENT (OUTPATIENT)
Dept: PHYSICAL THERAPY | Age: 54
End: 2024-05-29
Payer: COMMERCIAL

## 2024-06-04 ENCOUNTER — OFFICE VISIT (OUTPATIENT)
Dept: ORTHOPEDIC SURGERY | Age: 54
End: 2024-06-04

## 2024-06-04 ENCOUNTER — HOSPITAL ENCOUNTER (OUTPATIENT)
Dept: PHYSICAL THERAPY | Age: 54
Setting detail: THERAPIES SERIES
Discharge: HOME | End: 2024-06-04
Payer: COMMERCIAL

## 2024-06-04 VITALS — HEIGHT: 65 IN | BODY MASS INDEX: 27.99 KG/M2 | WEIGHT: 168 LBS

## 2024-06-04 DIAGNOSIS — M22.41 CHONDROMALACIA OF RIGHT PATELLA: ICD-10-CM

## 2024-06-04 DIAGNOSIS — M22.42 CHONDROMALACIA OF LEFT PATELLA: ICD-10-CM

## 2024-06-04 DIAGNOSIS — M17.0 PRIMARY OSTEOARTHRITIS OF BOTH KNEES: Primary | ICD-10-CM

## 2024-06-04 PROCEDURE — 97110 THERAPEUTIC EXERCISES: CPT | Performed by: PHYSICAL THERAPIST

## 2024-06-04 PROCEDURE — 97530 THERAPEUTIC ACTIVITIES: CPT | Performed by: PHYSICAL THERAPIST

## 2024-06-04 NOTE — FLOWSHEET NOTE
[facility] - Outpatient Rehabilitation and Therapy [address], [place] [address] office: [phone] fax: [phone]         Physical Therapy: TREATMENT/PROGRESS NOTE   Patient: Ally Chandler (53 y.o. female)   Examination Date: 2024   :  1970 MRN: [id number]   Visit #: 6   Insurance Allowable Auth Needed   8 auth thru  [x]Yes    []No    Insurance: Payor: JAVY / Plan: JAVY PATHAK ANDERSON / Product Type: *No Product type* /   Insurance ID: [id number] - (Commercial)  Secondary Insurance (if applicable):    Treatment Diagnosis:     ICD-10-CM    1. Right knee pain, unspecified chronicity  M25.561       2. Left knee pain, unspecified chronicity  M25.562       3. Quadriceps weakness  M62.81          Medical Diagnosis:  Right knee pain, unspecified chronicity [M25.561]  Left knee pain, unspecified chronicity [M25.562]   Referring Physician: Jermaine Stroud*  PCP: Olga Blake MD     Plan of care signed (Y/N): Y    Date of Patient follow up with Physician:      Progress Report/POC: NO  POC update due: (10 visits /OR AUTH LIMITS, whichever is less)  24                                            Precautions/ Contra-indications:           Latex allergy:  NO  Pacemaker:    NO  Contraindications for Manipulation: None  Date of Surgery: n/a  Other:    Red Flags:  None    C-SSRS Triggered by Intake questionnaire:   [x] No, Questionnaire did not trigger screening.   [] Yes, Patient intake triggered further evaluation      [] C-SSRS Screening completed  [] PCP notified via Plan of Care  [] Emergency services notified     Preferred Language for Healthcare:   [x] English       [] other:    SUBJECTIVE EXAMINATION     Patient stated complaint: Pt. States that her knees are feeling about the same. She made a follow up appointment with MD for later today. Pt. States that she continues to struggle with some of the exercises and fatigues very quickly

## 2024-06-08 NOTE — PROGRESS NOTES
patellofemoral      Range of Motion: 0/130      Strength: Normal with SLR      Special Tests: Patellofemoral provocative positive      Skin: There are no rashes, ulcerations or lesions.      Gait: Nonantalgic     Neurovascular - non focal and intact       Additional Comments:        Additional Examinations:                    Office Imaging Results/Procedures PerformedToday:            Office Procedures:     Orders Placed This Encounter   Procedures    Breg Hinged Lateral Stabilizer Knee Brace     Patient was prescribed a Breg Hinged Lateral Stabilizer.   The bilateral knees will require stabilization / immobilization from this semi-rigid / rigid orthosis to improve their function.  The orthosis will assist in protecting the affected area, provide functional support and facilitate healing.    The patient was educated and fit by a healthcare professional with expert knowledge and specialization in brace application while under the direct supervision of the physician.  Verbal and written instructions for the use of and application of this item were provided.   They were instructed to contact the office immediately should the brace result in increased pain, decreased sensation, increased swelling or worsening of the condition.           Other Outside Imaging and Testing Personally Reviewed:    No results found.           Assessment   Impression: .    Encounter Diagnoses   Name Primary?    Primary osteoarthritis of both knees Yes    Chondromalacia of left patella     Chondromalacia of right patella               Plan:       transition to [facility]  Activity modification -patellofemoral precautions and caution against overuse  Medical pain management: NSAID: Meloxicam as needed  Trial of patellofemoral functional bracing  Consider HA therapy bilateral knees as needed       Orders:        Orders Placed This Encounter   Procedures    Breg Hinged Lateral Stabilizer Knee Brace     Patient was prescribed a Breg Hinged Lateral

## 2024-06-10 ENCOUNTER — HOSPITAL ENCOUNTER (OUTPATIENT)
Dept: PHYSICAL THERAPY | Age: 54
Setting detail: THERAPIES SERIES
Discharge: HOME | End: 2024-06-10
Payer: COMMERCIAL

## 2024-06-10 PROCEDURE — 97530 THERAPEUTIC ACTIVITIES: CPT | Performed by: PHYSICAL THERAPIST

## 2024-06-10 PROCEDURE — 97110 THERAPEUTIC EXERCISES: CPT | Performed by: PHYSICAL THERAPIST

## 2024-06-10 NOTE — PLAN OF CARE
[facility] re-education (97212) resistance Sets/time Reps CUES NEEDED                                      Therapeutic Activity (44573)  Sets/time            Provided reassessment of progress and d/c planning, updated and reviewed HEP  5'            SLS balance airex 30\" 3ea    Sit to stand Chair  10# KB 2 10 Hip abd band - teal   Lateral stepping Teal @ knees 4 laps     Step up and over 6\" 2 10    Leg press DL 90#  SL 45# 2  2 10  10    SL RDL W/ FR for balance 1 15ea    Wall sit 45 deg 20\" 3                         Exercise in bold in above table were performed 06/10/2024      Modalities:    No modalities applied this session    Education/Home Exercise Program: Patient HEP program created electronically.  Refer to [employer] access code: Access Code: [email or web address]  Access Code: [email or web address]  URL: [email or web address]  Date: 06/10/2024  Prepared by: Haven Anderson    Exercises  - Prone Quadriceps Stretch with Strap  - 1 x daily - 7 x weekly - 3 reps - 30s hold  - Quad Set  - 1 x daily - 7 x weekly - 10 reps - 3s hold  - Supine Bridge with Mini Swiss Ball Between Knees  - 1 x daily - 7 x weekly - 10 reps  - Straight Leg Raise  - 1 x daily - 7 x weekly - 3 sets - 10 reps  - Sidelying Hip Abduction  - 1 x daily - 7 x weekly - 3 sets - 10 reps  - Straight Leg Raise with Arm Support  - 1 x daily - 7 x weekly - 5 reps - 10s hold  - Sit to Stand with Arms Crossed  - 1 x daily - 7 x weekly - 2 sets - 10 reps  - Side Stepping with Resistance at Thighs  - 1 x daily - 7 x weekly - 3 sets - 10 reps  - Wall Quarter Squat  - 1 x daily - 7 x weekly - 3 reps - 20s hold      ASSESSMENT     Today's Assessment:  Pt. Demonstrates slight improvements in strength and function but continues to have significant B quad weakness limiting activities such as stairs and sit to stand transitions. Reviewed correct technique with wall sits. Pt. Has plateaued in progress in therapy and is appropriate for d/c to independent home program. Reviewed

## 2024-08-26 ENCOUNTER — TELEPHONE (OUTPATIENT)
Dept: FAMILY MEDICINE CLINIC | Age: 54
End: 2024-08-26

## 2024-08-26 ENCOUNTER — OFFICE VISIT (OUTPATIENT)
Dept: ORTHOPEDIC SURGERY | Age: 54
End: 2024-08-26
Payer: COMMERCIAL

## 2024-08-26 ENCOUNTER — PATIENT MESSAGE (OUTPATIENT)
Dept: FAMILY MEDICINE CLINIC | Age: 54
End: 2024-08-26

## 2024-08-26 VITALS — WEIGHT: 168 LBS | BODY MASS INDEX: 27.99 KG/M2 | HEIGHT: 65 IN

## 2024-08-26 DIAGNOSIS — Z00.00 ANNUAL PHYSICAL EXAM: Primary | ICD-10-CM

## 2024-08-26 DIAGNOSIS — R20.2 PARESTHESIA OF RIGHT UPPER EXTREMITY: ICD-10-CM

## 2024-08-26 DIAGNOSIS — M25.511 ACUTE PAIN OF RIGHT SHOULDER: ICD-10-CM

## 2024-08-26 DIAGNOSIS — S46.011A STRAIN OF RIGHT ROTATOR CUFF CAPSULE, INITIAL ENCOUNTER: ICD-10-CM

## 2024-08-26 DIAGNOSIS — M54.2 CERVICAL PAIN (NECK): Primary | ICD-10-CM

## 2024-08-26 DIAGNOSIS — M47.812 CERVICAL SPONDYLOSIS: ICD-10-CM

## 2024-08-26 DIAGNOSIS — M50.30 DDD (DEGENERATIVE DISC DISEASE), CERVICAL: ICD-10-CM

## 2024-08-26 DIAGNOSIS — M75.51 BURSITIS OF RIGHT SHOULDER: ICD-10-CM

## 2024-08-26 PROCEDURE — 99214 OFFICE O/P EST MOD 30 MIN: CPT | Performed by: INTERNAL MEDICINE

## 2024-08-26 RX ORDER — CELECOXIB 200 MG/1
200 CAPSULE ORAL DAILY PRN
Qty: 30 CAPSULE | Refills: 1 | Status: SHIPPED | OUTPATIENT
Start: 2024-08-26

## 2024-08-26 RX ORDER — METHYLPREDNISOLONE 4 MG
TABLET, DOSE PACK ORAL
Qty: 1 KIT | Refills: 0 | Status: SHIPPED | OUTPATIENT
Start: 2024-08-26

## 2024-08-26 SDOH — HEALTH STABILITY: PHYSICAL HEALTH: ON AVERAGE, HOW MANY MINUTES DO YOU ENGAGE IN EXERCISE AT THIS LEVEL?: 20 MIN

## 2024-08-26 SDOH — HEALTH STABILITY: PHYSICAL HEALTH: ON AVERAGE, HOW MANY DAYS PER WEEK DO YOU ENGAGE IN MODERATE TO STRENUOUS EXERCISE (LIKE A BRISK WALK)?: 4 DAYS

## 2024-08-26 NOTE — TELEPHONE ENCOUNTER
Patient scheduled.    Recent Visits  Date Type Provider Dept   11/03/23 Office Visit Hawa Villegas MD [facility]   10/30/23 Office Visit Olga Blake MD [facility]   09/21/23 Office Visit Olga Blake MD [facility]   Showing recent visits within past 540 days with a meds authorizing provider and meeting all other requirements  Future Appointments  Date Type Provider Dept   09/05/24 Appointment Olga Blake MD [facility]   Showing future appointments within next 150 days with a meds authorizing provider and meeting all other requirements

## 2024-08-26 NOTE — TELEPHONE ENCOUNTER
Pt is wanting to have labs ordered and completed by her appt. Please order labs for patient and let me know when completed so I can inform the patient.

## 2024-08-26 NOTE — PROGRESS NOTES
Chief Complaint:   Chief Complaint   Patient presents with    Shoulder Pain     Right shoulder/ Cervical - Pain that goes from her neck through her shoulder and down her arm. Started about 3 months ago.          History of Present Illness:       Patient is a 54 y.o. female presents with the above complaint. The symptoms began yeah monthsago and started without an injury. The patient describes a sharp, aching pain that does radiate.  The symptoms are constant  and are are worsening since the onset.       The symptoms  suggest   a typical rotator cuff provacative pattern. The pain is anterior and lateral about the shoulder.There is not associated mechanical symptoms localizing to the shoulder. The patient denies symptoms of instability about the shoulder.     Superimposed on this she is experiencing neuritic symptoms radiating into the C7 distribution of the right hand.    The neck pain is location: right sided, left sided.  Severity 8/10    The patient has  noted new onset or progressive weakness of the upper extremites. The neck pain : Right arm pain is 25: 75  and follows a C7 dermatomal pattern radiating into the hand.    Treatment to date has included none   The patient denies history of neck trauma. There is not history or orthopaedic cervical spine surgery.     Work up to date has included: None    The patient has no prior history of autoimmune disease, inflammatory arthropathy or crystal arthropathy.           Treatment to date has included nothing specific.      Pain levels 8.    The symptoms brachialgia do not seem to correlate with head and neck movement. The patient denies new onset weakness of the upper extremity.    The patient does not know have history of prior shoulder trauma.     Workup has included: None    There is no history or autoimmune disease, inflammatory arthropathy or crystal arthropathy.          Past Medical History:        Past Medical History:   Diagnosis Date    Joint pain

## 2024-08-28 ENCOUNTER — OFFICE VISIT (OUTPATIENT)
Dept: ORTHOPEDIC SURGERY | Age: 54
End: 2024-08-28
Payer: COMMERCIAL

## 2024-08-28 VITALS — HEIGHT: 65 IN | BODY MASS INDEX: 27.99 KG/M2 | WEIGHT: 168 LBS

## 2024-08-28 DIAGNOSIS — M75.51 BURSITIS OF RIGHT SHOULDER: ICD-10-CM

## 2024-08-28 DIAGNOSIS — M67.911 TENDINOPATHY OF ROTATOR CUFF, RIGHT: ICD-10-CM

## 2024-08-28 DIAGNOSIS — M75.101 TEAR OF RIGHT SUPRASPINATUS TENDON: Primary | ICD-10-CM

## 2024-08-28 PROCEDURE — 99213 OFFICE O/P EST LOW 20 MIN: CPT | Performed by: INTERNAL MEDICINE

## 2024-08-28 NOTE — PROGRESS NOTES
Chief Complaint:   Chief Complaint   Patient presents with    Shoulder Pain     Right Shoulder - Complete ultrasound today. Medrol with 50% relief for a few hours.          History of Present Illness:       Patient is a 54 y.o. female returns follow up for the above complaint. The patient was last seen approximately 2 daysago. The symptoms show no change since the last visit. The patient has had no further testing for the problem.      Shoulder remains problematic.  She is experiencing some benefit from Medrol 50 percent pain alleviation    Pain 4/10 and continues to follow rotator cuff provocative pattern    No new injuries no new events     Past Medical History:        Past Medical History:   Diagnosis Date    Joint pain     Seasonal allergies         Present Medications:         Current Outpatient Medications   Medication Sig Dispense Refill    methylPREDNISolone (MEDROL, EDUARDO,) 4 MG tablet By mouth. 1 kit 0    celecoxib (CELEBREX) 200 MG capsule Take 1 capsule by mouth daily as needed for Pain Start after completing Medrol 30 capsule 1    cetirizine (ZYRTEC) 10 MG tablet Take 1 tablet by mouth daily      ferrous sulfate (IRON 325) 325 (65 Fe) MG tablet Take 1 tablet by mouth daily       No current facility-administered medications for this visit.         Allergies:      No Known Allergies        Review of Systems:    Pertinent items are noted in HPI       Vital Signs:    There were no vitals filed for this visit.     General Exam:     Constitutional: Patient is adequately groomed with no evidence of malnutrition    Physical Exam: right shoulder      Primary Exam:    Inspection: No deformity attributable effusion      Palpation: No focal tenderness      Range of Motion: Interval increased active range of motion increased with less pain      Strength: Moderate weakness abduction with pain      Skin: There are no rashes, ulcerations or lesions.      Gait: Nonantalgic      Neurovascular - non focal and

## 2024-08-29 ENCOUNTER — TELEPHONE (OUTPATIENT)
Dept: ORTHOPEDIC SURGERY | Age: 54
End: 2024-08-29

## 2024-09-03 NOTE — TELEPHONE ENCOUNTER
LVM for pt that she will need to have L shld evaluated before it can be determined that an US or MRI is needed.  Pt is sched for 9/4/24 already for L shld eval.

## 2024-09-04 ENCOUNTER — OFFICE VISIT (OUTPATIENT)
Dept: ORTHOPEDIC SURGERY | Age: 54
End: 2024-09-04
Payer: COMMERCIAL

## 2024-09-04 VITALS — BODY MASS INDEX: 27.99 KG/M2 | HEIGHT: 65 IN | WEIGHT: 168 LBS

## 2024-09-04 DIAGNOSIS — M25.512 LEFT SHOULDER PAIN, UNSPECIFIED CHRONICITY: Primary | ICD-10-CM

## 2024-09-04 DIAGNOSIS — S46.012A STRAIN OF LEFT ROTATOR CUFF CAPSULE, INITIAL ENCOUNTER: ICD-10-CM

## 2024-09-04 PROCEDURE — 99214 OFFICE O/P EST MOD 30 MIN: CPT | Performed by: INTERNAL MEDICINE

## 2024-09-04 NOTE — PROGRESS NOTES
Chief Complaint:   Chief Complaint   Patient presents with    Shoulder Pain     Left Shoulder - Started hurting 3 months ago. It's not terrible but she doesn't want to wait until it gets really bad to be seen.          History of Present Illness:       Patient is a 54 y.o. female presents with the above complaint. The symptoms began 3 monthsago and concurrently with the right shoulder and started without an injury. The patient describes a sharp, aching pain that does not radiate.  The symptoms are constant  and are mildly improving since the onset related to recent course of Medrol.      The symptoms do show a typical rotator cuff provacative pattern. The pain is diffuse about the shoulder.There is not associated mechanical symptoms localizing to the shoulder. The patient denies symptoms of instability about the shoulder.     Treatment to date has included  Medrol .      Pain levels 3.    The symptoms do not correlate with head and neck movement. The patient denies new onset weakness of the upper extremity.    The patient does not have history of prior shoulder trauma.     Workup has included: None    There is no history or autoimmune disease, inflammatory arthropathy or crystal arthropathy.          Past Medical History:        Past Medical History:   Diagnosis Date    Joint pain     Seasonal allergies          Past Surgical History:   Procedure Laterality Date    TUBAL LIGATION  1997         Present Medications:         Current Outpatient Medications   Medication Sig Dispense Refill    methylPREDNISolone (MEDROL, EDUARDO,) 4 MG tablet By mouth. 1 kit 0    celecoxib (CELEBREX) 200 MG capsule Take 1 capsule by mouth daily as needed for Pain Start after completing Medrol 30 capsule 1    cetirizine (ZYRTEC) 10 MG tablet Take 1 tablet by mouth daily      ferrous sulfate (IRON 325) 325 (65 Fe) MG tablet Take 1 tablet by mouth daily       No current facility-administered medications for this visit.

## 2024-09-05 ENCOUNTER — OFFICE VISIT (OUTPATIENT)
Dept: FAMILY MEDICINE CLINIC | Age: 54
End: 2024-09-05

## 2024-09-05 VITALS
SYSTOLIC BLOOD PRESSURE: 112 MMHG | BODY MASS INDEX: 27.52 KG/M2 | TEMPERATURE: 97.3 F | HEART RATE: 69 BPM | OXYGEN SATURATION: 99 % | HEIGHT: 65 IN | DIASTOLIC BLOOD PRESSURE: 73 MMHG | WEIGHT: 165.2 LBS

## 2024-09-05 DIAGNOSIS — D50.9 IRON DEFICIENCY ANEMIA, UNSPECIFIED IRON DEFICIENCY ANEMIA TYPE: ICD-10-CM

## 2024-09-05 DIAGNOSIS — Z00.00 ANNUAL PHYSICAL EXAM: Primary | ICD-10-CM

## 2024-09-05 DIAGNOSIS — E55.9 VITAMIN D DEFICIENCY: ICD-10-CM

## 2024-09-05 DIAGNOSIS — N89.8 VAGINAL DRYNESS: ICD-10-CM

## 2024-09-05 DIAGNOSIS — Z00.00 ANNUAL PHYSICAL EXAM: ICD-10-CM

## 2024-09-05 DIAGNOSIS — Z12.31 ENCOUNTER FOR SCREENING MAMMOGRAM FOR MALIGNANT NEOPLASM OF BREAST: ICD-10-CM

## 2024-09-05 DIAGNOSIS — N89.8 VAGINAL DISCHARGE: ICD-10-CM

## 2024-09-05 DIAGNOSIS — R07.89 DISCOMFORT IN CHEST: ICD-10-CM

## 2024-09-05 LAB
25(OH)D3 SERPL-MCNC: 33.2 NG/ML
ALBUMIN SERPL-MCNC: 4.3 G/DL (ref 3.4–5)
ALBUMIN/GLOB SERPL: 1.5 {RATIO} (ref 1.1–2.2)
ALP SERPL-CCNC: 70 U/L (ref 40–129)
ALT SERPL-CCNC: 37 U/L (ref 10–40)
ANION GAP SERPL CALCULATED.3IONS-SCNC: 11 MMOL/L (ref 3–16)
AST SERPL-CCNC: 27 U/L (ref 15–37)
BASOPHILS # BLD: 0 K/UL (ref 0–0.2)
BASOPHILS NFR BLD: 0.7 %
BILIRUB SERPL-MCNC: 0.4 MG/DL (ref 0–1)
BUN SERPL-MCNC: 12 MG/DL (ref 7–20)
CALCIUM SERPL-MCNC: 9.6 MG/DL (ref 8.3–10.6)
CHLORIDE SERPL-SCNC: 100 MMOL/L (ref 99–110)
CHOLEST SERPL-MCNC: 261 MG/DL (ref 0–199)
CO2 SERPL-SCNC: 28 MMOL/L (ref 21–32)
CREAT SERPL-MCNC: 0.7 MG/DL (ref 0.6–1.1)
DEPRECATED RDW RBC AUTO: 13.4 % (ref 12.4–15.4)
EOSINOPHIL # BLD: 0.4 K/UL (ref 0–0.6)
EOSINOPHIL NFR BLD: 5.8 %
FERRITIN SERPL IA-MCNC: 154 NG/ML (ref 15–150)
FOLATE SERPL-MCNC: 9.59 NG/ML (ref 4.78–24.2)
GFR SERPLBLD CREATININE-BSD FMLA CKD-EPI: >90 ML/MIN/{1.73_M2}
GLUCOSE SERPL-MCNC: 79 MG/DL (ref 70–99)
HCT VFR BLD AUTO: 37.6 % (ref 36–48)
HDLC SERPL-MCNC: 62 MG/DL (ref 40–60)
HGB BLD-MCNC: 12.6 G/DL (ref 12–16)
IRON SATN MFR SERPL: 31 % (ref 15–50)
IRON SERPL-MCNC: 104 UG/DL (ref 37–145)
LDLC SERPL CALC-MCNC: 166 MG/DL
LYMPHOCYTES # BLD: 2.2 K/UL (ref 1–5.1)
LYMPHOCYTES NFR BLD: 29.8 %
MCH RBC QN AUTO: 28.5 PG (ref 26–34)
MCHC RBC AUTO-ENTMCNC: 33.4 G/DL (ref 31–36)
MCV RBC AUTO: 85.3 FL (ref 80–100)
MONOCYTES # BLD: 0.5 K/UL (ref 0–1.3)
MONOCYTES NFR BLD: 7.3 %
NEUTROPHILS # BLD: 4.2 K/UL (ref 1.7–7.7)
NEUTROPHILS NFR BLD: 56.4 %
PLATELET # BLD AUTO: 320 K/UL (ref 135–450)
PMV BLD AUTO: 8.9 FL (ref 5–10.5)
POTASSIUM SERPL-SCNC: 4.2 MMOL/L (ref 3.5–5.1)
PROT SERPL-MCNC: 7.2 G/DL (ref 6.4–8.2)
RBC # BLD AUTO: 4.41 M/UL (ref 4–5.2)
SODIUM SERPL-SCNC: 139 MMOL/L (ref 136–145)
TIBC SERPL-MCNC: 335 UG/DL (ref 260–445)
TRIGL SERPL-MCNC: 166 MG/DL (ref 0–150)
VIT B12 SERPL-MCNC: 472 PG/ML (ref 211–911)
VLDLC SERPL CALC-MCNC: 33 MG/DL
WBC # BLD AUTO: 7.4 K/UL (ref 4–11)

## 2024-09-05 RX ORDER — TRIAMCINOLONE ACETONIDE 1 MG/G
CREAM TOPICAL
Qty: 45 G | Refills: 0 | Status: SHIPPED | OUTPATIENT
Start: 2024-09-05

## 2024-09-05 RX ORDER — PANTOPRAZOLE SODIUM 40 MG/1
40 TABLET, DELAYED RELEASE ORAL
Qty: 90 TABLET | Refills: 1 | Status: SHIPPED | OUTPATIENT
Start: 2024-09-05

## 2024-09-05 ASSESSMENT — PATIENT HEALTH QUESTIONNAIRE - PHQ9
2. FEELING DOWN, DEPRESSED OR HOPELESS: NOT AT ALL
SUM OF ALL RESPONSES TO PHQ QUESTIONS 1-9: 0
SUM OF ALL RESPONSES TO PHQ QUESTIONS 1-9: 0
SUM OF ALL RESPONSES TO PHQ9 QUESTIONS 1 & 2: 0
SUM OF ALL RESPONSES TO PHQ QUESTIONS 1-9: 0
1. LITTLE INTEREST OR PLEASURE IN DOING THINGS: NOT AT ALL
SUM OF ALL RESPONSES TO PHQ QUESTIONS 1-9: 0

## 2024-09-05 NOTE — PROGRESS NOTES
screen  10/22/2023    Flu vaccine (1) 08/01/2024    COVID-19 Vaccine (4 - 2023-24 season) 09/01/2024    Hepatitis B vaccine (2 of 3 - 19+ 3-dose series) 10/03/2024    Shingles vaccine (2 of 2) 10/31/2024    Depression Screen  09/05/2025    Colorectal Cancer Screen  03/21/2027    Cervical cancer screen  09/21/2028    Lipids  09/22/2028    DTaP/Tdap/Td vaccine (2 - Td or Tdap) 09/21/2033    Hepatitis A vaccine  Aged Out    Hib vaccine  Aged Out    Polio vaccine  Aged Out    Meningococcal (ACWY) vaccine  Aged Out    Pneumococcal 0-64 years Vaccine  Aged Out          Assessment/Plan:     1. Annual physical exam  - Comprehensive Metabolic Panel; Future  - CBC with Auto Differential; Future  - Lipid Panel; Future  - Zoster, SHINGRIX, (18 yrs +), IM  - Hep B, ENGERIX-B, (age 20 yrs+), IM, 1mL, 3-dose    2. Vitamin D deficiency  - Vitamin D 25 Hydroxy; Future    3. Iron deficiency anemia, unspecified iron deficiency anemia type  We will check for  - Comprehensive Metabolic Panel; Future  - CBC with Auto Differential; Future  - Lipid Panel; Future  - Iron and TIBC; Future  - Ferritin; Future  - Vitamin D 25 Hydroxy; Future  - Vitamin B12 & Folate; Future    4. Encounter for screening mammogram for malignant neoplasm of breast  - ELLIOT DIGITAL SCREEN W OR WO CAD BILATERAL; Future    5. Vaginal discharge  - VAGINAL PATHOGENS PROBE *A     6 Discomfort in chest  Feeling of something stuck  Does not look cardiac  We will give trial of pantoprazole    7 vaginal dryness  Due to postmenopausal    Follow up in 1 month  Olga Blake MD  9/5/2024 9:48 AM

## 2024-09-06 LAB
CANDIDA DNA VAG QL NAA+PROBE: ABNORMAL
G VAGINALIS DNA SPEC QL NAA+PROBE: ABNORMAL
T VAGINALIS DNA VAG QL NAA+PROBE: ABNORMAL

## 2024-09-06 RX ORDER — METRONIDAZOLE 500 MG/1
500 TABLET ORAL 2 TIMES DAILY
Qty: 14 TABLET | Refills: 0 | Status: SHIPPED | OUTPATIENT
Start: 2024-09-06 | End: 2024-09-13

## 2024-09-12 ENCOUNTER — HOSPITAL ENCOUNTER (OUTPATIENT)
Dept: MAMMOGRAPHY | Age: 54
Discharge: HOME | End: 2024-09-12
Attending: FAMILY MEDICINE
Payer: COMMERCIAL

## 2024-09-12 DIAGNOSIS — Z12.31 ENCOUNTER FOR SCREENING MAMMOGRAM FOR MALIGNANT NEOPLASM OF BREAST: ICD-10-CM

## 2024-09-12 PROCEDURE — 77063 BREAST TOMOSYNTHESIS BI: CPT

## 2024-09-24 ENCOUNTER — OFFICE VISIT (OUTPATIENT)
Dept: ORTHOPEDIC SURGERY | Age: 54
End: 2024-09-24
Payer: COMMERCIAL

## 2024-09-24 VITALS — BODY MASS INDEX: 27.49 KG/M2 | WEIGHT: 165 LBS | HEIGHT: 65 IN

## 2024-09-24 DIAGNOSIS — M75.101 TEAR OF RIGHT SUPRASPINATUS TENDON: Primary | ICD-10-CM

## 2024-09-24 DIAGNOSIS — M67.911 TENDINOPATHY OF ROTATOR CUFF, RIGHT: ICD-10-CM

## 2024-09-24 DIAGNOSIS — S46.811S PARTIAL TEAR OF RIGHT SUBSCAPULARIS TENDON, SEQUELA: ICD-10-CM

## 2024-09-24 PROCEDURE — 99214 OFFICE O/P EST MOD 30 MIN: CPT | Performed by: INTERNAL MEDICINE

## 2024-10-02 ENCOUNTER — TELEPHONE (OUTPATIENT)
Dept: ORTHOPEDIC SURGERY | Age: 54
End: 2024-10-02

## 2024-10-02 ENCOUNTER — OFFICE VISIT (OUTPATIENT)
Dept: ORTHOPEDIC SURGERY | Age: 54
End: 2024-10-02
Payer: COMMERCIAL

## 2024-10-02 VITALS — HEIGHT: 65 IN | RESPIRATION RATE: 12 BRPM | BODY MASS INDEX: 27.49 KG/M2 | WEIGHT: 165 LBS

## 2024-10-02 DIAGNOSIS — M75.101 TEAR OF RIGHT SUPRASPINATUS TENDON: Primary | ICD-10-CM

## 2024-10-02 DIAGNOSIS — M75.51 BURSITIS OF RIGHT SHOULDER: ICD-10-CM

## 2024-10-02 PROCEDURE — 99204 OFFICE O/P NEW MOD 45 MIN: CPT | Performed by: ORTHOPAEDIC SURGERY

## 2024-10-02 RX ORDER — ACETAMINOPHEN 500 MG
1000 TABLET ORAL EVERY 6 HOURS
Qty: 240 TABLET | Refills: 0 | Status: SHIPPED | OUTPATIENT
Start: 2024-10-02 | End: 2024-11-01

## 2024-10-02 RX ORDER — KETOROLAC TROMETHAMINE 10 MG/1
10 TABLET, FILM COATED ORAL EVERY 6 HOURS PRN
Qty: 20 TABLET | Refills: 0 | Status: SHIPPED | OUTPATIENT
Start: 2024-10-02 | End: 2025-10-02

## 2024-10-02 NOTE — PROGRESS NOTES
available in the scanned medical record as documented by the patient.Today's review pertinent items are noted in HPI.      No notes on file    Physical Exam:  Resp 12   Ht 1.651 m (5' 5\")   Wt 74.8 kg (165 lb)   BMI 27.46 kg/m²       General: No acute distress, well nourished  CV: No obvious peripheral edema. Normal peripheral pulses  Neuro: Alert & oriented x 3  Psych: Normal mood and affect     Right  Upper Extremity Exam:     Inspection:   No gross deformities, no signs of infection.    Palpation: Tenderness over biceps tendon in the proximal groove    Active Range of Motion:  Forward Elevation 145 ++guarding, Abduction 120, External Rotation 45, Internal Rotation T9    Passive Range of Motion:   Forward Elevation 170 (not frozen), Abduction 145, External Rotation 60, Internal Rotation T9    Strength:  External Rotation 4+/5, Internal Rotation 4+/5, Supraspinatus 4+/5, Champagne Toast 4/5     Special Tests:    Hawkin's impingement test: negative   Cross body adduction test (ACJ): negative   Speed's test (biceps): positive   Yergusson's Test (biceps): negative   Argueta's Test (biceps/labrum): positive   Apprehension Relocation Test (Anterior instability): negative   Jerk Test (Posterior Instability and/or posterior labrum): negative   Gagey Test / Load Shift Test (MDI): negative    Neurovascular: Sensation to light touch is intact, no motor deficits, palpable radial pulses 2+         Radiographic:  3 xray views of the right  shoulder including True AP in internal and external and axillary lateral were reviewed and interpreted by me today and show NIL ACUTE    MRI right shoulder 9/18/2024  CONCLUSION:   1. Full-thickness 1 x 1 cm tear of the supraspinatus far anterior footprint, with mild tendinopathy and peritendinitis of the supraspinatus remnant and adjacent infraspinatus tendon.   2. Lateral outlet stenosis and impingement, both bony and ligamentous, with subcortical pitting of the posterolateral humeral

## 2024-10-02 NOTE — TELEPHONE ENCOUNTER
Appointment Request     Patient requesting earlier appointment: Yes  Appointment offered to patient: YES  Patient Contact Number: [phone]     [place]

## 2024-10-03 ENCOUNTER — OFFICE VISIT (OUTPATIENT)
Dept: FAMILY MEDICINE CLINIC | Age: 54
End: 2024-10-03

## 2024-10-03 VITALS
WEIGHT: 170.8 LBS | BODY MASS INDEX: 28.46 KG/M2 | OXYGEN SATURATION: 98 % | TEMPERATURE: 97.3 F | HEIGHT: 65 IN | SYSTOLIC BLOOD PRESSURE: 112 MMHG | HEART RATE: 81 BPM | DIASTOLIC BLOOD PRESSURE: 71 MMHG

## 2024-10-03 DIAGNOSIS — K21.9 GASTROESOPHAGEAL REFLUX DISEASE WITHOUT ESOPHAGITIS: Primary | ICD-10-CM

## 2024-10-03 DIAGNOSIS — Z23 NEED FOR VACCINATION: ICD-10-CM

## 2024-10-03 DIAGNOSIS — E78.2 MIXED HYPERLIPIDEMIA: ICD-10-CM

## 2024-10-03 DIAGNOSIS — M54.50 ACUTE MIDLINE LOW BACK PAIN WITHOUT SCIATICA: ICD-10-CM

## 2024-10-03 RX ORDER — CYCLOBENZAPRINE HCL 10 MG
10 TABLET ORAL 3 TIMES DAILY PRN
Qty: 30 TABLET | Refills: 0 | Status: SHIPPED | OUTPATIENT
Start: 2024-10-03 | End: 2024-10-13

## 2024-10-03 SDOH — ECONOMIC STABILITY: FOOD INSECURITY: WITHIN THE PAST 12 MONTHS, YOU WORRIED THAT YOUR FOOD WOULD RUN OUT BEFORE YOU GOT MONEY TO BUY MORE.: NEVER TRUE

## 2024-10-03 SDOH — ECONOMIC STABILITY: FOOD INSECURITY: WITHIN THE PAST 12 MONTHS, THE FOOD YOU BOUGHT JUST DIDN'T LAST AND YOU DIDN'T HAVE MONEY TO GET MORE.: NEVER TRUE

## 2024-10-03 SDOH — ECONOMIC STABILITY: INCOME INSECURITY: HOW HARD IS IT FOR YOU TO PAY FOR THE VERY BASICS LIKE FOOD, HOUSING, MEDICAL CARE, AND HEATING?: NOT HARD AT ALL

## 2024-10-03 NOTE — PROGRESS NOTES
ENGERIX-B, (age 20 yrs+), IM, 1mL, 3-dose  - Influenza, FLUCELVAX Trivalent, (age 6 mo+) IM, Preservative Free, 0.5mL       No follow-ups on file.      Olga Blake MD  10/3/2024 10:21 AM

## 2024-10-04 NOTE — TELEPHONE ENCOUNTER
Not sure what pt would like to schedule for, as the message lacked info, but I LVM for pt to call back to schedule.

## 2024-10-07 ENCOUNTER — OFFICE VISIT (OUTPATIENT)
Dept: ORTHOPEDIC SURGERY | Age: 54
End: 2024-10-07
Payer: COMMERCIAL

## 2024-10-07 VITALS — BODY MASS INDEX: 28.32 KG/M2 | WEIGHT: 170 LBS | HEIGHT: 65 IN

## 2024-10-07 DIAGNOSIS — M75.21 BICEPS TENDONITIS ON RIGHT: ICD-10-CM

## 2024-10-07 DIAGNOSIS — M75.51 BURSITIS OF RIGHT SHOULDER: ICD-10-CM

## 2024-10-07 DIAGNOSIS — M75.101 TEAR OF RIGHT SUPRASPINATUS TENDON: Primary | ICD-10-CM

## 2024-10-07 PROCEDURE — 20611 DRAIN/INJ JOINT/BURSA W/US: CPT | Performed by: ORTHOPAEDIC SURGERY

## 2024-10-07 RX ORDER — ROPIVACAINE HYDROCHLORIDE 5 MG/ML
6 INJECTION, SOLUTION EPIDURAL; INFILTRATION; PERINEURAL ONCE
Status: COMPLETED | OUTPATIENT
Start: 2024-10-07 | End: 2024-10-07

## 2024-10-07 RX ORDER — METHYLPREDNISOLONE ACETATE 40 MG/ML
80 INJECTION, SUSPENSION INTRA-ARTICULAR; INTRALESIONAL; INTRAMUSCULAR; SOFT TISSUE ONCE
Status: COMPLETED | OUTPATIENT
Start: 2024-10-07 | End: 2024-10-07

## 2024-10-07 RX ADMIN — METHYLPREDNISOLONE ACETATE 80 MG: 40 INJECTION, SUSPENSION INTRA-ARTICULAR; INTRALESIONAL; INTRAMUSCULAR; SOFT TISSUE at 11:00

## 2024-10-07 RX ADMIN — ROPIVACAINE HYDROCHLORIDE 6 ML: 5 INJECTION, SOLUTION EPIDURAL; INFILTRATION; PERINEURAL at 11:01

## 2024-10-08 NOTE — PROGRESS NOTES
10/7/24  10:59 AM        NDC: 67144-162-79   -   Ropivacaine 0.5 %    LOT: [id number]    COMMENT: RIGHT BICEPS TENDON CORTISONE INJECTION      NDC: 1555-0378-57   -   Depo Medrol 40mg    LOT: [id number]    COMMENT: RIGHT BICEPS TENDON CORTISONE INJECTION         
physical therapy 1 week after the injection.    A new physical therapy letter was documented in EPIC today.   We will see Ally back in 4-6 weeks and/or as needed. All questions were answered to patient's satisfaction and She was encouraged to call with any further questions or concerns. Ally Chandler is in agreement with this plan.      I spent  25 minutes on patient education and coordinating care today. This included time examining the patient, reviewing the patient's chart and relevant imaging, and chart documentation. This excluded any separately billed procedures.        Sincerely,    Javier Degroot MD [facility]  Orthopaedic Surgeon - Hip Preservation & Sports Medicine   [facility] [address], [address], [address]  Email: [email or web address]  Office: [phone]    10/08/24  7:46 AM      The encounter with Ally Chandler was carried out by myself, Dr Degroot, who personally examined the patient and reviewed the plan.      This dictation was performed with a verbal recognition program (DRAGON) and it was checked for errors.  It is possible that there are still dictated errors within this office note.  If so, please bring any errors to my attention for an addendum.  All efforts were made to ensure that this office note is accurate.

## 2024-10-14 ENCOUNTER — HOSPITAL ENCOUNTER (OUTPATIENT)
Dept: PHYSICAL THERAPY | Age: 54
Setting detail: THERAPIES SERIES
Discharge: HOME | End: 2024-10-14
Payer: COMMERCIAL

## 2024-10-14 DIAGNOSIS — M25.511 RIGHT SHOULDER PAIN, UNSPECIFIED CHRONICITY: Primary | ICD-10-CM

## 2024-10-14 PROCEDURE — 97140 MANUAL THERAPY 1/> REGIONS: CPT

## 2024-10-14 PROCEDURE — 97161 PT EVAL LOW COMPLEX 20 MIN: CPT

## 2024-10-14 PROCEDURE — 97110 THERAPEUTIC EXERCISES: CPT

## 2024-10-14 NOTE — PLAN OF CARE
[facility] - Outpatient Rehabilitation and Therapy [address], [place] [address] office: [phone] fax: [phone]     Physical Therapy Initial Evaluation Certification      Dear Javier Degroot MD ,    We had the pleasure of evaluating the following patient for physical therapy services at [facility].  A summary of our findings can be found in the initial assessment below.  This includes our plan of care.  If you have any questions or concerns regarding these findings, please do not hesitate to contact me at the office phone number listed above.  Thank you for the referral.     Physician Signature:_______________________________Date:__________________  By signing above (or electronic signature), therapist’s plan is approved by physician       Physical Therapy: TREATMENT/PROGRESS NOTE   Patient: Ally Chandler (54 y.o. female)   Examination Date: 10/14/2024   :  1970 MRN: [id number]   Visit #: 1   Insurance Allowable Auth Needed   [place] - pending auth [x]Yes    []No    Insurance: Payor: JAVY / Plan: JAVY PATHAK ANDERSON / Product Type: *No Product type* /   Insurance ID: [id number] - (Commercial)  Secondary Insurance (if applicable):    Treatment Diagnosis:     ICD-10-CM    1. Right shoulder pain, unspecified chronicity  M25.511          Medical Diagnosis:  Tear of right supraspinatus tendon [M75.101]  Biceps tendonitis on right [M75.21]  Bursitis of right shoulder [M75.51]   Referring Physician: Javier Degroot MD  PCP: Olga Blake MD     Plan of care signed (Y/N):     Date of Patient follow up with Physician: in 1 month (not scheduled currently)     Plan of Care Report: EVAL today  POC update due: (10 visits /OR AUTH LIMITS, whichever is less)  2024                                             Medical History:  Comorbidities:  COVID-19  Relevant Medical History:

## 2024-10-21 ENCOUNTER — HOSPITAL ENCOUNTER (OUTPATIENT)
Dept: PHYSICAL THERAPY | Age: 54
Setting detail: THERAPIES SERIES
Discharge: HOME | End: 2024-10-21
Payer: COMMERCIAL

## 2024-10-21 PROCEDURE — 97110 THERAPEUTIC EXERCISES: CPT

## 2024-10-21 PROCEDURE — 97530 THERAPEUTIC ACTIVITIES: CPT

## 2024-10-21 PROCEDURE — 97140 MANUAL THERAPY 1/> REGIONS: CPT

## 2024-10-21 NOTE — FLOWSHEET NOTE
in pain to <4/10 to facilitate improvement in movement, function, and ADLs as indicated by Functional Deficits.  [] Progressing: [] Met: [] Not Met: [] Adjusted    Long Term Goals: To be achieved in: 6 weeks  1. Disability index score of 34% or less for the SPADI to assist with reaching prior level of function with activities such as reaching overhead and behind her back.  [] Progressing: [] Met: [] Not Met: [] Adjusted  2. Patient will demonstrate increased AROM of R shoulder flexion to 140 degrees without pain to allow for proper joint functioning to enable patient to reach overhead.   [] Progressing: [] Met: [] Not Met: [] Adjusted  3. Patient will demonstrate increased Strength of R shoulder to at least 4/5 throughout without pain to allow for proper functional mobility to enable patient to return to perform dressing and bathing ADLs.   [] Progressing: [] Met: [] Not Met: [] Adjusted  4. Patient will return to reaching overhead and behind her back without increased symptoms or restriction.   [] Progressing: [] Met: [] Not Met: [] Adjusted  5. Patient will be able to lift 10# object from waist level in order to perform work duties as a .     [] Progressing: [] Met: [] Not Met: [] Adjusted     Overall Progression Towards Functional goals/ Treatment Progress Update:  [] Patient is progressing as expected towards functional goals listed.    [] Progression is slowed due to complexities/Impairments listed.  [] Progression has been slowed due to co-morbidities.  [x] Plan just implemented, too soon (<30days) to assess goals progression   [] Goals require adjustment due to lack of progress  [] Patient is not progressing as expected and requires additional follow up with physician  [] Other:     TREATMENT PLAN     Frequency/Duration: 2x/week for 6 weeks for the following treatment interventions:    Interventions:  Therapeutic Exercise (43491) including: strength training, ROM, and functional mobility  Therapeutic

## 2024-10-24 ENCOUNTER — HOSPITAL ENCOUNTER (OUTPATIENT)
Dept: PHYSICAL THERAPY | Age: 54
Setting detail: THERAPIES SERIES
Discharge: HOME | End: 2024-10-24
Payer: COMMERCIAL

## 2024-10-24 PROCEDURE — 97530 THERAPEUTIC ACTIVITIES: CPT

## 2024-10-24 PROCEDURE — 97140 MANUAL THERAPY 1/> REGIONS: CPT

## 2024-10-24 PROCEDURE — 97110 THERAPEUTIC EXERCISES: CPT

## 2024-10-24 NOTE — FLOWSHEET NOTE
[facility] - Outpatient Rehabilitation and Therapy [address], [place] [address] office: [phone] fax: [phone]     Physical Therapy: TREATMENT/PROGRESS NOTE   Patient: Ally Chandler (54 y.o. female)   Examination Date: 10/24/2024   :  1970 MRN: [id number]   Visit #: 3   Insurance Allowable Auth Needed   [place] - pending auth [x]Yes    []No    Insurance: Payor: JAVY / Plan: JAVY PATHAK ANDERSON / Product Type: *No Product type* /   Insurance ID: [id number] - (Commercial)  Secondary Insurance (if applicable):    Treatment Diagnosis:     ICD-10-CM    1. Right shoulder pain, unspecified chronicity  M25.511          Medical Diagnosis:  Tear of right supraspinatus tendon [M75.101]  Biceps tendonitis on right [M75.21]  Bursitis of right shoulder [M75.51]   Referring Physician: Javier Degroot MD  PCP: Olga Blake MD     Plan of care signed (Y/N):     Date of Patient follow up with Physician: in 1 month (not scheduled currently)     Plan of Care Report: NO  POC update due: (10 visits /OR AUTH LIMITS, whichever is less)  2024                                             Medical History:  Comorbidities:  COVID-19  Relevant Medical History:                                          Precautions/ Contra-indications:           Latex allergy:  NO  Pacemaker:    NO  Contraindications for Manipulation: None  Date of Surgery: N/A  Other:    Red Flags:  None    Suicide Screening:   The patient did not verbalize a primary behavioral concern, suicidal ideation, suicidal intent, or demonstrate suicidal behaviors.    Preferred Language for Healthcare:   [x] English       [] other:    SUBJECTIVE EXAMINATION     Patient stated complaint: Pt reports that her shoulder is feeling better overall. Pt has less difficulty using her arm, but still has trouble reaching overhead and gets fatigued with cooking/preparing food. No issues sleeping lately.      Test used Initial

## 2024-10-28 ENCOUNTER — HOSPITAL ENCOUNTER (OUTPATIENT)
Dept: PHYSICAL THERAPY | Age: 54
Setting detail: THERAPIES SERIES
Discharge: HOME | End: 2024-10-28
Payer: COMMERCIAL

## 2024-10-28 PROCEDURE — 97530 THERAPEUTIC ACTIVITIES: CPT

## 2024-10-28 PROCEDURE — 97140 MANUAL THERAPY 1/> REGIONS: CPT

## 2024-10-28 PROCEDURE — 97110 THERAPEUTIC EXERCISES: CPT

## 2024-10-28 NOTE — FLOWSHEET NOTE
[facility] - Outpatient Rehabilitation and Therapy [address], [place] [address] office: [phone] fax: [phone]     Physical Therapy: TREATMENT/PROGRESS NOTE   Patient: Ally Chandler (54 y.o. female)   Examination Date: 10/28/2024   :  1970 MRN: [id number]   Visit #: 4   Insurance Allowable Auth Needed   [place] - pending auth [x]Yes    []No    Insurance: Payor: JAVY / Plan: JAVY PATHAK ANDERSON / Product Type: *No Product type* /   Insurance ID: [id number] - (Commercial)  Secondary Insurance (if applicable):    Treatment Diagnosis:     ICD-10-CM    1. Right shoulder pain, unspecified chronicity  M25.511          Medical Diagnosis:  Tear of right supraspinatus tendon [M75.101]  Biceps tendonitis on right [M75.21]  Bursitis of right shoulder [M75.51]   Referring Physician: Javier Degroot MD  PCP: Olga Blake MD     Plan of care signed (Y/N):     Date of Patient follow up with Physician: in 1 month (not scheduled currently)     Plan of Care Report: NO  POC update due: (10 visits /OR AUTH LIMITS, whichever is less)  2024                                             Medical History:  Comorbidities:  COVID-19  Relevant Medical History:                                          Precautions/ Contra-indications:           Latex allergy:  NO  Pacemaker:    NO  Contraindications for Manipulation: None  Date of Surgery: N/A  Other:    Red Flags:  None    Suicide Screening:   The patient did not verbalize a primary behavioral concern, suicidal ideation, suicidal intent, or demonstrate suicidal behaviors.    Preferred Language for Healthcare:   [x] English       [] other:    SUBJECTIVE EXAMINATION     Patient stated complaint: Pt reports that her shoulder is feeling better overall. Pt reports having less difficulty reaching overhead, but still has some pain associated with this.      Test used Initial score  10/14/24 10/28/2024   Pain Summary VAS 5/10

## 2024-10-31 ENCOUNTER — HOSPITAL ENCOUNTER (OUTPATIENT)
Dept: PHYSICAL THERAPY | Age: 54
Setting detail: THERAPIES SERIES
Discharge: HOME | End: 2024-10-31
Payer: COMMERCIAL

## 2024-10-31 PROCEDURE — 97140 MANUAL THERAPY 1/> REGIONS: CPT

## 2024-10-31 PROCEDURE — 97530 THERAPEUTIC ACTIVITIES: CPT

## 2024-10-31 PROCEDURE — 97110 THERAPEUTIC EXERCISES: CPT

## 2024-10-31 NOTE — FLOWSHEET NOTE
Wednesday.      Test used Initial score  10/14/24 10/31/2024   Pain Summary VAS 5/10 2-3/10   Functional questionnaire SPADI 88/130; 68% disability    Other:                OBJECTIVE EXAMINATION     10/21/24:  R shoulder PROM: flex = 125, ER = 65    10/14/24    Cervical AROM screen: [] WFL  [] abnormal:     PROM AROM    L R L R   Cervical Flexion    WNL    Cervical Extension    WNL    Cervical Rotation   WNL  WNL   Cervical Side-bend   WNL \"tight\" WNL   Shoulder Flexion    150 70 *pn   Shoulder Abduction    130 40 *pn   Shoulder External Rotation   50     Shoulder Internal Rotation   40     Elbow Flexion        Elbow Extension        Pronation        Supination        Wrist Flexion        Wrist Extension        Radial Deviation        Ulnar Deviation            Strength (0-5) Left Right    Shoulder Shrug (C4)     Shoulder Flex 4+ NT    Shoulder Abd (C5) 4+ NT   Shoulder ER 5 NT   Shoulder IR 5 NT   Biceps (C6)     Triceps (C7)     Lats     Middle Trap     Rhomboids     Lower Trap      Pronation      Supination      Wrist Flex (C7)     Wrist Ext (C6)     Wrist Radial Deviation     Wrist Ulnar Deviation                    Exercises/Interventions     Therapeutic Ex (30051)  resistance Sets/time Reps Notes/Cues/Progressions   Pulleys  4'  Flex/scaption   Pendulums   1 20x ea CW, CCW   Wall slides  1 15    Supine cane ER   5\" 15    Scap retractions  1 20    Supine cane press   Supine cane press + flexion 2#  0# 2  1 10  10    S/L ER 1# 2 10    TB rows  TB ext Red  red 2  2 10  10 Significant cues required                        Manual Intervention (99707)  TIME     GH joint mobs - inf  5'     R shoulder PROM  10'                          NMR re-education (40798) resistance Sets/time Reps CUES NEEDED          Submax IR/ER iso's  5-10\" 10x ea    R/S at 90 deg shoulder flexion  20\" 5x                  Therapeutic Activity (87913)  Sets/time            Pt education  5'  PT POC, anatomy, diagnosis   Landmine press  EQ bar

## 2024-11-04 ENCOUNTER — HOSPITAL ENCOUNTER (OUTPATIENT)
Dept: PHYSICAL THERAPY | Age: 54
Setting detail: THERAPIES SERIES
Discharge: HOME | End: 2024-11-04
Payer: COMMERCIAL

## 2024-11-04 PROCEDURE — 97110 THERAPEUTIC EXERCISES: CPT

## 2024-11-04 PROCEDURE — 97140 MANUAL THERAPY 1/> REGIONS: CPT

## 2024-11-04 PROCEDURE — 97530 THERAPEUTIC ACTIVITIES: CPT

## 2024-11-04 NOTE — PLAN OF CARE
[facility] - Outpatient Rehabilitation and Therapy [address], [place] [address] office: [phone] fax: [phone]     Physical Therapy Re-Certification Plan of Care    Dear Javier Degroot MD  ,    We had the pleasure of treating the following patient for physical therapy services at [facility]. A summary of our findings can be found in the updated assessment below.  This includes our plan of care.  If you have any questions or concerns regarding these findings, please do not hesitate to contact me at the office phone number checked above.  Thank you for the referral.     Physician Signature:________________________________Date:__________________  By signing above (or electronic signature), therapist's plan is approved by physician      Functional Outcome: SPADI 38/130; 29% disability  Ally Chandler 1970 continues to present with functional deficits in strength symmetry, endurance of strength, cardiovascular endurance, eccentric control, and muscle activation  limiting ability with reaching overhead, carrying items, lifting items, pushing or pulling activity, light home activity, and heavy home activity .  During therapy this date, patient required verbal cueing, tactile cueing, modification of technique, progression of exercises and program, and manual interventions for exercise progression and improving proper muscle recruitment and activation/motor control patterns. Patient will continue to benefit from ongoing evaluation and advanced clinical decision from a Physical Therapist to improve pain control, muscle strength, neuromuscular control, endurance, and high level IADLs to safely return to PLOF and ADLs/IADLs without symptoms or restrictions.    Overall Response to Treatment:  Patient is responding well to treatment and improvement is noted with regards to goals    Total Visits: 6     Recommendation:    [x] Continue PT 1-2x /

## 2024-11-06 ENCOUNTER — HOSPITAL ENCOUNTER (OUTPATIENT)
Dept: PHYSICAL THERAPY | Age: 54
Setting detail: THERAPIES SERIES
Discharge: HOME | End: 2024-11-06
Payer: COMMERCIAL

## 2024-11-06 ENCOUNTER — OFFICE VISIT (OUTPATIENT)
Dept: ORTHOPEDIC SURGERY | Age: 54
End: 2024-11-06
Payer: COMMERCIAL

## 2024-11-06 VITALS — HEIGHT: 65 IN | WEIGHT: 170 LBS | BODY MASS INDEX: 28.32 KG/M2

## 2024-11-06 DIAGNOSIS — M75.101 TEAR OF RIGHT SUPRASPINATUS TENDON: ICD-10-CM

## 2024-11-06 DIAGNOSIS — M75.21 BICEPS TENDONITIS ON RIGHT: Primary | ICD-10-CM

## 2024-11-06 PROCEDURE — 97110 THERAPEUTIC EXERCISES: CPT

## 2024-11-06 PROCEDURE — 99214 OFFICE O/P EST MOD 30 MIN: CPT | Performed by: ORTHOPAEDIC SURGERY

## 2024-11-06 PROCEDURE — 97140 MANUAL THERAPY 1/> REGIONS: CPT

## 2024-11-06 PROCEDURE — 97112 NEUROMUSCULAR REEDUCATION: CPT

## 2024-11-06 RX ORDER — MELOXICAM 15 MG/1
15 TABLET ORAL DAILY
Qty: 30 TABLET | Refills: 2 | Status: SHIPPED | OUTPATIENT
Start: 2024-11-06

## 2024-11-06 NOTE — PLAN OF CARE
improving proper muscle recruitment and activation/motor control patterns, modulating pain, and increasing ROM. Next visit plan to add new exercises  Pt has F/U with Dr. Degroot today.     Electronically Signed by Kristina Marcus, PT  Date: 11/06/2024     Note: Portions of this note have been templated and/or copied from initial evaluation, reassessments and prior notes for documentation efficiency.    Note: If patient does not return for scheduled/recommended follow up visits, this note will serve as a discharge from care along with the most recent update on progress.    Ortho Evaluation

## 2024-11-06 NOTE — PROGRESS NOTES
Chief Complaint    Shoulder Pain (RIGHT SHOULDER)      History of Present Illness:  Ally Chandler is a pleasant, 54 y.o., female, here today for follow up of RIGHT SHOULDER pain , improved with PT and biecps injections.  She reports no new injuries or setbacks.  No pain at night. Has activity related biceps/cuff pain        Medical History:  Patient's medications, allergies, past medical, surgical, social and family histories were reviewed and updated as appropriate.    No notes on file    Review of Systems  A 14 point review of systems was completed by the patient and is available in the media section of the scanned medical record and was reviewed on 11/6/2024.  The review is negative with the exception of those things mentioned in the HPI and Past Medical History    Vital Signs:  There were no vitals filed for this visit.    General/Appearance: Alert and oriented and in no apparent distress.    Skin:  There are no skin lesions, cellulitis, or extreme edema. The patient has warm and well-perfused Bilateral upper extremities with brisk capillary refill.      right Shoulder Exam:  Inspection:   No gross deformities, no signs of infection.    Palpation: Tenderness over biceps and anterior cuff    Active Range of Motion:  Forward Elevation 180,  External Rotation 60, Internal Rotation t7    Passive Range of Motion:  SAME     Strength:  External Rotation 4/5, Internal Rotation 4+/5, Supraspinatus 4/5, Champagne Toast 4/5    Special Tests:   +speeds  No Vinny muscle deformity.    Neurovascular: Sensation to light touch is intact, no motor deficits, palpable radial pulses 2+      Radiology:     MRI RIGHT shoulder  CONCLUSION:    1. Full-thickness 1 x 1 cm tear of the supraspinatus far anterior footprint, with mild tendinopathy and peritendinitis of the supraspinatus remnant and adjacent infraspinatus tendon.    2. Lateral outlet stenosis and impingement, both bony and ligamentous, with subcortical pitting of the

## 2024-11-14 ENCOUNTER — HOSPITAL ENCOUNTER (OUTPATIENT)
Dept: PHYSICAL THERAPY | Age: 54
Setting detail: THERAPIES SERIES
Discharge: HOME | End: 2024-11-14
Payer: COMMERCIAL

## 2024-11-14 PROCEDURE — 97140 MANUAL THERAPY 1/> REGIONS: CPT

## 2024-11-14 PROCEDURE — 97112 NEUROMUSCULAR REEDUCATION: CPT

## 2024-11-14 PROCEDURE — 97110 THERAPEUTIC EXERCISES: CPT

## 2024-11-14 NOTE — FLOWSHEET NOTE
Therapy (74238) as indicated to include: Passive Range of Motion, Gr I-IV mobilizations, Grade V Manipulation, Soft Tissue Mobilization, and Dry Needling/IASTM  Modalities as needed that may include: Cryotherapy and Vasoneumatic Compression    Plan: Cont POC- Continue emphasis/focus on exercise progression, improving proper muscle recruitment and activation/motor control patterns, modulating pain, and increasing ROM. Next visit plan to add new exercises  Pt has 2 more approved insurance visits.    Electronically Signed by Kristina Marcus, PT  Date: 11/14/2024     Note: Portions of this note have been templated and/or copied from initial evaluation, reassessments and prior notes for documentation efficiency.    Note: If patient does not return for scheduled/recommended follow up visits, this note will serve as a discharge from care along with the most recent update on progress.    Ortho Evaluation

## 2024-11-18 ENCOUNTER — HOSPITAL ENCOUNTER (OUTPATIENT)
Dept: PHYSICAL THERAPY | Age: 54
Setting detail: THERAPIES SERIES
Discharge: HOME | End: 2024-11-18
Payer: COMMERCIAL

## 2024-11-18 PROCEDURE — 97112 NEUROMUSCULAR REEDUCATION: CPT

## 2024-11-18 PROCEDURE — 97140 MANUAL THERAPY 1/> REGIONS: CPT

## 2024-11-18 PROCEDURE — 97110 THERAPEUTIC EXERCISES: CPT

## 2024-11-18 NOTE — FLOWSHEET NOTE
[facility] - Outpatient Rehabilitation and Therapy [address], [place] [address] office: [phone] fax: [phone]       Physical Therapy: TREATMENT/PROGRESS NOTE   Patient: Ally Chandler (54 y.o. female)   Examination Date: 2024   :  1970 MRN: [id number]   Visit #: 9   Insurance Allowable Auth Needed   10 visits approved 10/14/24 - 24 [x]Yes    []No    Insurance: Payor: JAVY / Plan: JAVY PATHAK ANDERSON / Product Type: *No Product type* /   Insurance ID: [id number] - (Commercial)  Secondary Insurance (if applicable):    Treatment Diagnosis:     ICD-10-CM    1. Right shoulder pain, unspecified chronicity  M25.511          Medical Diagnosis:  Tear of right supraspinatus tendon [M75.101]  Biceps tendonitis on right [M75.21]  Bursitis of right shoulder [M75.51]   Referring Physician: Javier Degroot MD  PCP: Olga Blake MD     Plan of care signed (Y/N):     Date of Patient follow up with Physician: 24     Plan of Care Report: NO  POC update due: (10 visits /OR AUTH LIMITS, whichever is less)  2024                                             Medical History:  Comorbidities:  COVID-19  Relevant Medical History:                                          Precautions/ Contra-indications:           Latex allergy:  NO  Pacemaker:    NO  Contraindications for Manipulation: None  Date of Surgery: N/A  Other:    Red Flags:  None    Suicide Screening:   The patient did not verbalize a primary behavioral concern, suicidal ideation, suicidal intent, or demonstrate suicidal behaviors.    Preferred Language for Healthcare:   [x] English       [] other:    SUBJECTIVE EXAMINATION     Patient stated complaint: Pt reports that her shoulder continues to feel pretty good overall. Pt reports that she has less difficulty using her arm for shorter periods of time, but has pain in her shoulder and in UT after about 1 hour of activity (I.e. cooking or

## 2024-11-20 ENCOUNTER — HOSPITAL ENCOUNTER (OUTPATIENT)
Dept: PHYSICAL THERAPY | Age: 54
Setting detail: THERAPIES SERIES
Discharge: HOME | End: 2024-11-20
Payer: COMMERCIAL

## 2024-11-20 PROCEDURE — 97110 THERAPEUTIC EXERCISES: CPT

## 2024-11-20 PROCEDURE — 97140 MANUAL THERAPY 1/> REGIONS: CPT

## 2024-11-20 PROCEDURE — 97112 NEUROMUSCULAR REEDUCATION: CPT

## 2024-11-20 NOTE — FLOWSHEET NOTE
Progressing: [x] Met: [] Not Met: [] Adjusted    Long Term Goals: To be achieved in: 6 weeks  1. Disability index score of 34% or less for the SPADI to assist with reaching prior level of function with activities such as reaching overhead and behind her back.  [x] Progressing: [] Met: [] Not Met: [] Adjusted  2. Patient will demonstrate increased AROM of R shoulder flexion to 140 degrees without pain to allow for proper joint functioning to enable patient to reach overhead.   [] Progressing: [x] Met: [] Not Met: [] Adjusted  3. Patient will demonstrate increased Strength of R shoulder to at least 4/5 throughout without pain to allow for proper functional mobility to enable patient to return to perform dressing and bathing ADLs.   [x] Progressing: [] Met: [] Not Met: [] Adjusted  4. Patient will return to reaching overhead and behind her back without increased symptoms or restriction.   [x] Progressing: [] Met: [] Not Met: [] Adjusted  5. Patient will be able to lift 10# object from waist level in order to perform work duties as a .     [x] Progressing: [] Met: [] Not Met: [] Adjusted     Overall Progression Towards Functional goals/ Treatment Progress Update:  [x] Patient is progressing as expected towards functional goals listed.    [] Progression is slowed due to complexities/Impairments listed.  [] Progression has been slowed due to co-morbidities.  [] Plan just implemented, too soon (<30days) to assess goals progression   [] Goals require adjustment due to lack of progress  [] Patient is not progressing as expected and requires additional follow up with physician  [] Other:     TREATMENT PLAN     Frequency/Duration: 2x/week for 6 weeks for the following treatment interventions:    Interventions:  Therapeutic Exercise (07354) including: strength training, ROM, and functional mobility  Therapeutic Activities (64507) including: functional mobility training and education.  Neuromuscular Re-education (71130)

## 2024-11-26 ENCOUNTER — HOSPITAL ENCOUNTER (OUTPATIENT)
Dept: PHYSICAL THERAPY | Age: 54
Setting detail: THERAPIES SERIES
Discharge: HOME | End: 2024-11-26
Payer: COMMERCIAL

## 2024-11-26 PROCEDURE — 97110 THERAPEUTIC EXERCISES: CPT

## 2024-11-26 PROCEDURE — 97112 NEUROMUSCULAR REEDUCATION: CPT

## 2024-11-26 PROCEDURE — 97530 THERAPEUTIC ACTIVITIES: CPT

## 2024-11-26 NOTE — FLOWSHEET NOTE
Dry Needling/IASTM  Modalities as needed that may include: Cryotherapy and Vasoneumatic Compression    Plan: Cont POC- Continue emphasis/focus on exercise progression, improving proper muscle recruitment and activation/motor control patterns, modulating pain, and increasing ROM. Next visit plan to add new exercises  Continue with PT 1-2x/week x 4-6 weeks.     Electronically Signed by Kristina Marcus, PT  Date: 11/26/2024     Note: Portions of this note have been templated and/or copied from initial evaluation, reassessments and prior notes for documentation efficiency.    Note: If patient does not return for scheduled/recommended follow up visits, this note will serve as a discharge from care along with the most recent update on progress.    Ortho Evaluation

## 2024-12-02 ENCOUNTER — HOSPITAL ENCOUNTER (OUTPATIENT)
Dept: PHYSICAL THERAPY | Age: 54
Setting detail: THERAPIES SERIES
Discharge: HOME | End: 2024-12-02
Payer: COMMERCIAL

## 2024-12-02 PROCEDURE — 97110 THERAPEUTIC EXERCISES: CPT

## 2024-12-02 PROCEDURE — 97112 NEUROMUSCULAR REEDUCATION: CPT

## 2024-12-02 PROCEDURE — 97140 MANUAL THERAPY 1/> REGIONS: CPT

## 2024-12-02 NOTE — FLOWSHEET NOTE
[facility] - Outpatient Rehabilitation and Therapy [address], [place] [address] office: [phone] fax: [phone]     Physical Therapy: TREATMENT/PROGRESS NOTE   Patient: Ally Chandler (54 y.o. female)   Examination Date: 2024   :  1970 MRN: [id number]   Visit #:   Insurance Allowable Auth Needed   10 visits approved 10/14/24 - 24  6 additional visits approved 24 - 24 [x]Yes    []No    Insurance: Payor: JAVY / Plan: JAVY PATHAK ANDERSON / Product Type: *No Product type* /   Insurance ID: [id number] - (Commercial)  Secondary Insurance (if applicable):    Treatment Diagnosis:     ICD-10-CM    1. Right shoulder pain, unspecified chronicity  M25.511          Medical Diagnosis:  Tear of right supraspinatus tendon [M75.101]  Biceps tendonitis on right [M75.21]  Bursitis of right shoulder [M75.51]   Referring Physician: Javier Degroot MD  PCP: Olga Blake MD     Plan of care signed (Y/N):     Date of Patient follow up with Physician: 2025     Plan of Care Report: NO  POC update due: (10 visits /OR AUTH LIMITS, whichever is less)  2024                                             Medical History:  Comorbidities:  COVID-19  Relevant Medical History:                                          Precautions/ Contra-indications:           Latex allergy:  NO  Pacemaker:    NO  Contraindications for Manipulation: None  Date of Surgery: N/A  Other:    Red Flags:  None    Suicide Screening:   The patient did not verbalize a primary behavioral concern, suicidal ideation, suicidal intent, or demonstrate suicidal behaviors.    Preferred Language for Healthcare:   [x] English       [] other:    SUBJECTIVE EXAMINATION     Patient stated complaint: Pt reports that her shoulder continues to fatigue quickly with cooking or cleaning. Pt reports that she continues to have some pain in her clavicle, which seems to be worst at nighttime.

## 2024-12-05 ENCOUNTER — HOSPITAL ENCOUNTER (OUTPATIENT)
Dept: PHYSICAL THERAPY | Age: 54
Setting detail: THERAPIES SERIES
Discharge: HOME | End: 2024-12-05
Payer: COMMERCIAL

## 2024-12-05 PROCEDURE — 97140 MANUAL THERAPY 1/> REGIONS: CPT

## 2024-12-05 PROCEDURE — 97530 THERAPEUTIC ACTIVITIES: CPT

## 2024-12-05 PROCEDURE — 97110 THERAPEUTIC EXERCISES: CPT

## 2024-12-05 NOTE — FLOWSHEET NOTE
(99673)         Other:    Other:    Total Timed Code Tx Minutes 43 3       Total Treatment Minutes 43        Charge Justification:  (84996) THERAPEUTIC EXERCISE - Provided verbal/tactile cueing for activities related to strengthening, flexibility, endurance, ROM performed to prevent loss of range of motion, maintain or improve muscular strength or increase flexibility, following either an injury or surgery.   (89195) HOME EXERCISE PROGRAM - Reviewed/Progressed HEP activities related to strengthening, flexibility, endurance, ROM performed to prevent loss of range of motion, maintain or improve muscular strength or increase flexibility, following either an injury or surgery.  (42315) MANUAL THERAPY -  Manual therapy techniques, 1 or more regions, each 15 minutes (Mobilization/manipulation, manual lymphatic drainage, manual traction) for the purpose of modulating pain, promoting relaxation,  increasing ROM, reducing/eliminating soft tissue swelling/inflammation/restriction, improving soft tissue extensibility and allowing for proper ROM for normal function with self care, mobility, lifting and ambulation    GOALS     Patient stated goal: \"To be able to use my right arm\"  [x] Progressing: [] Met: [] Not Met: [] Adjusted    Therapist goals for Patient:   Short Term Goals: To be achieved in: 2 weeks  1. Independent in HEP and progression per patient tolerance, in order to prevent re-injury.   [] Progressing: [x] Met: [] Not Met: [] Adjusted  2. Patient will have a decrease in pain to <4/10 to facilitate improvement in movement, function, and ADLs as indicated by Functional Deficits.  [] Progressing: [x] Met: [] Not Met: [] Adjusted    Long Term Goals: To be achieved in: 6 weeks  1. Disability index score of 34% or less for the SPADI to assist with reaching prior level of function with activities such as reaching overhead and behind her back.  [x] Progressing: [] Met: [] Not Met: [] Adjusted  2. Patient will demonstrate

## 2024-12-09 ENCOUNTER — HOSPITAL ENCOUNTER (OUTPATIENT)
Dept: PHYSICAL THERAPY | Age: 54
Setting detail: THERAPIES SERIES
Discharge: HOME | End: 2024-12-09
Payer: COMMERCIAL

## 2024-12-09 PROCEDURE — 97110 THERAPEUTIC EXERCISES: CPT

## 2024-12-09 PROCEDURE — 97140 MANUAL THERAPY 1/> REGIONS: CPT

## 2024-12-09 PROCEDURE — 97530 THERAPEUTIC ACTIVITIES: CPT

## 2024-12-09 NOTE — FLOWSHEET NOTE
activities such as reaching overhead and behind her back.  [x] Progressing: [] Met: [] Not Met: [] Adjusted  2. Patient will demonstrate increased AROM of R shoulder flexion to 140 degrees without pain to allow for proper joint functioning to enable patient to reach overhead.   [] Progressing: [x] Met: [] Not Met: [] Adjusted  3. Patient will demonstrate increased Strength of R shoulder to at least 4/5 throughout without pain to allow for proper functional mobility to enable patient to return to perform dressing and bathing ADLs.   [x] Progressing: [] Met: [] Not Met: [] Adjusted  4. Patient will return to reaching overhead and behind her back without increased symptoms or restriction.   [x] Progressing: [] Met: [] Not Met: [] Adjusted  5. Patient will be able to lift 10# object from waist level in order to perform work duties as a .     [x] Progressing: [] Met: [] Not Met: [] Adjusted     Overall Progression Towards Functional goals/ Treatment Progress Update:  [x] Patient is progressing as expected towards functional goals listed.    [] Progression is slowed due to complexities/Impairments listed.  [] Progression has been slowed due to co-morbidities.  [] Plan just implemented, too soon (<30days) to assess goals progression   [] Goals require adjustment due to lack of progress  [] Patient is not progressing as expected and requires additional follow up with physician  [] Other:     TREATMENT PLAN     Frequency/Duration: 2x/week for 6 weeks for the following treatment interventions:    Interventions:  Therapeutic Exercise (53924) including: strength training, ROM, and functional mobility  Therapeutic Activities (71133) including: functional mobility training and education.  Neuromuscular Re-education (44052) activation and proprioception, including postural re-education.    Manual Therapy (24268) as indicated to include: Passive Range of Motion, Gr I-IV mobilizations, Grade V Manipulation, Soft Tissue 
Bleeding that does not stop/Swelling that gets worse/Pain not relieved by Medications/Fever greater than (need to indicate Fahrenheit or Celsius)/Wound/Surgical Site with redness, or foul smelling discharge or pus/Nausea and vomiting that does not stop

## 2024-12-12 ENCOUNTER — HOSPITAL ENCOUNTER (OUTPATIENT)
Dept: PHYSICAL THERAPY | Age: 54
Setting detail: THERAPIES SERIES
Discharge: HOME | End: 2024-12-12
Payer: COMMERCIAL

## 2024-12-12 PROCEDURE — 97140 MANUAL THERAPY 1/> REGIONS: CPT

## 2024-12-12 PROCEDURE — 97110 THERAPEUTIC EXERCISES: CPT

## 2024-12-12 PROCEDURE — 97530 THERAPEUTIC ACTIVITIES: CPT

## 2024-12-12 NOTE — FLOWSHEET NOTE
[facility] - Outpatient Rehabilitation and Therapy [address], [place] [address] office: [phone] fax: [phone]     Physical Therapy: TREATMENT/PROGRESS NOTE   Patient: Ally Chandler (54 y.o. female)   Examination Date: 2024   :  1970 MRN: [id number]   Visit #: 15 [id number]  Insurance Allowable Auth Needed   10 visits approved 10/14/24 - 24  6 additional visits approved 24 - 24 [x]Yes    []No    Insurance: Payor: JAVY / Plan: JAVY PATHAK ANDERSON / Product Type: *No Product type* /   Insurance ID: [id number] - (Commercial)  Secondary Insurance (if applicable):    Treatment Diagnosis:     ICD-10-CM    1. Right shoulder pain, unspecified chronicity  M25.511          Medical Diagnosis:  Tear of right supraspinatus tendon [M75.101]  Biceps tendonitis on right [M75.21]  Bursitis of right shoulder [M75.51]   Referring Physician: Javier Degroot MD  PCP: Olga Blake MD     Plan of care signed (Y/N):     Date of Patient follow up with Physician: 2025     Plan of Care Report: NO  POC update due: (10 visits /OR AUTH LIMITS, whichever is less)  2024                                             Medical History:  Comorbidities:  COVID-19  Relevant Medical History:                                          Precautions/ Contra-indications:           Latex allergy:  NO  Pacemaker:    NO  Contraindications for Manipulation: None  Date of Surgery: N/A  Other:    Red Flags:  None    Suicide Screening:   The patient did not verbalize a primary behavioral concern, suicidal ideation, suicidal intent, or demonstrate suicidal behaviors.    Preferred Language for Healthcare:   [x] English       [] other:    SUBJECTIVE EXAMINATION     Patient stated complaint: Pt reports her c/c is limited functional ability with cooking or cleaning due to her pain. Pt reports her pain is much better than it previously was, but still is her limiting factor.

## 2024-12-16 ENCOUNTER — HOSPITAL ENCOUNTER (OUTPATIENT)
Dept: PHYSICAL THERAPY | Age: 54
Setting detail: THERAPIES SERIES
Discharge: HOME | End: 2024-12-16
Payer: COMMERCIAL

## 2024-12-16 PROCEDURE — 97140 MANUAL THERAPY 1/> REGIONS: CPT

## 2024-12-16 PROCEDURE — 97530 THERAPEUTIC ACTIVITIES: CPT

## 2024-12-16 PROCEDURE — 97110 THERAPEUTIC EXERCISES: CPT

## 2024-12-16 NOTE — PLAN OF CARE
[facility] - Outpatient Rehabilitation and Therapy [address], [place] [address] office: [phone] fax: [phone]    Physical Therapy Re-Certification Plan of Care    Dear Javier Degroot MD  ,    We had the pleasure of treating the following patient for physical therapy services at [facility]. A summary of our findings can be found in the updated assessment below.  This includes our plan of care.  If you have any questions or concerns regarding these findings, please do not hesitate to contact me at the office phone number checked above.  Thank you for the referral.     Physician Signature:________________________________Date:__________________  By signing above (or electronic signature), therapist's plan is approved by physician      Total Visits: 16     Overall Response to Treatment:  Patient is responding well to treatment and improvement is noted with regards to goals    Recommendation:    [x] Continue PT 1-2x / wk for 4 weeks.   [] Hold PT, pending MD visit   [] Discharge to [facility]. Follow up with PT or MD PRN.       Physical Therapy: TREATMENT/PROGRESS NOTE   Patient: Ally Chandler (54 y.o. female)   Examination Date: 2024   :  1970 MRN: [id number]   Visit #:   Insurance Allowable Auth Needed   10 visits approved 10/14/24 - 24  6 additional visits approved 24 - 24 [x]Yes    []No    Insurance: Payor: JAVY / Plan: JAVY PATHAK ANDERSON / Product Type: *No Product type* /   Insurance ID: [id number] - (Commercial)  Secondary Insurance (if applicable):    Treatment Diagnosis:     ICD-10-CM    1. Right shoulder pain, unspecified chronicity  M25.511          Medical Diagnosis:  Tear of right supraspinatus tendon [M75.101]  Biceps tendonitis on right [M75.21]  Bursitis of right shoulder [M75.51]   Referring Physician: Javier Degroot MD  PCP: Olga Blake MD     Plan of care signed (Y/N):

## 2024-12-30 ENCOUNTER — APPOINTMENT (OUTPATIENT)
Dept: PHYSICAL THERAPY | Age: 54
End: 2024-12-30
Payer: COMMERCIAL

## 2025-01-08 ENCOUNTER — HOSPITAL ENCOUNTER (OUTPATIENT)
Dept: PHYSICAL THERAPY | Age: 55
Setting detail: THERAPIES SERIES
Discharge: HOME | End: 2025-01-08

## 2025-01-15 ENCOUNTER — OFFICE VISIT (OUTPATIENT)
Dept: ORTHOPEDIC SURGERY | Age: 55
End: 2025-01-15
Payer: COMMERCIAL

## 2025-01-15 VITALS — RESPIRATION RATE: 12 BRPM | WEIGHT: 170 LBS | BODY MASS INDEX: 28.32 KG/M2 | HEIGHT: 65 IN

## 2025-01-15 DIAGNOSIS — M75.51 BURSITIS OF RIGHT SHOULDER: ICD-10-CM

## 2025-01-15 DIAGNOSIS — M75.101 TEAR OF RIGHT SUPRASPINATUS TENDON: Primary | ICD-10-CM

## 2025-01-15 PROCEDURE — 99213 OFFICE O/P EST LOW 20 MIN: CPT

## 2025-01-16 NOTE — PROGRESS NOTES
step-off or bruising. Cervical alignment is normal.     Palpation:  No evidence of tenderness. There is mild paraspinal spasm. No midline bony tenderness.    Range of Motion: Full ROM including flexion, extension, and lateral bending     Strength: 5/5 bilateral upper extremities     Skin: Intact without rashes ulcerations or lesions.            Sensation: Intact to light touch throughout both upper extremities.       Special Tests: Negative Spurling. No focal motor deficits present throughout the upper extremities.       Radiology:     MRI RIGHT shoulder 9/17/24  CONCLUSION:    1. Full-thickness 1 x 1 cm tear of the supraspinatus far anterior footprint, with mild tendinopathy and peritendinitis of the supraspinatus remnant and adjacent infraspinatus tendon.    2. Lateral outlet stenosis and impingement, both bony and ligamentous, with subcortical pitting of the posterolateral humeral head.    3. Mild-to-moderate subacromial and subdeltoid bursitis.    4. Biceps pulley mechanism injury, with mild tendinopathy and 2 cm concealed interstitial delamination of the distal insertional fibers, and partial subluxation of the long head biceps tendon into the subscapularis laminar space.    5. Mild glenohumeral and AC joint arthrosis. No evidence of labral tear.    6. Mild glenohumeral capsulitis with thickening of the axillary capsule and rotator interval; possibly reactive in origin, but may represent a component of clinical or subclinical adhesive capsulitis, with accompanying distension of the subscapularis recess and subcoracoid bursal space.          Assessment :  Ms. Ally Chandler is a pleasant, 54 y.o. patient with recurrent pain due to full thickness supraspinatus rotator cuff tear.     Cannot rule out cervical radiculopathy at today's visit.     Impression:  No diagnosis found.    Office Procedures:  No orders of the defined types were placed in this encounter.      Treatment Plan:     Ally bae not

## 2025-01-20 ENCOUNTER — OFFICE VISIT (OUTPATIENT)
Dept: ORTHOPEDIC SURGERY | Age: 55
End: 2025-01-20
Payer: COMMERCIAL

## 2025-01-20 VITALS — BODY MASS INDEX: 28.32 KG/M2 | RESPIRATION RATE: 12 BRPM | WEIGHT: 170 LBS | HEIGHT: 65 IN

## 2025-01-20 DIAGNOSIS — M75.51 BURSITIS OF RIGHT SHOULDER: ICD-10-CM

## 2025-01-20 DIAGNOSIS — M75.101 TEAR OF RIGHT SUPRASPINATUS TENDON: Primary | ICD-10-CM

## 2025-01-20 PROCEDURE — 20611 DRAIN/INJ JOINT/BURSA W/US: CPT | Performed by: ORTHOPAEDIC SURGERY

## 2025-01-20 RX ORDER — METHYLPREDNISOLONE ACETATE 40 MG/ML
80 INJECTION, SUSPENSION INTRA-ARTICULAR; INTRALESIONAL; INTRAMUSCULAR; SOFT TISSUE ONCE
Status: COMPLETED | OUTPATIENT
Start: 2025-01-20 | End: 2025-01-20

## 2025-01-20 RX ORDER — ROPIVACAINE HYDROCHLORIDE 5 MG/ML
14 INJECTION, SOLUTION EPIDURAL; INFILTRATION; PERINEURAL ONCE
Status: COMPLETED | OUTPATIENT
Start: 2025-01-20 | End: 2025-01-20

## 2025-01-20 RX ADMIN — METHYLPREDNISOLONE ACETATE 80 MG: 40 INJECTION, SUSPENSION INTRA-ARTICULAR; INTRALESIONAL; INTRAMUSCULAR; SOFT TISSUE at 10:38

## 2025-01-20 RX ADMIN — ROPIVACAINE HYDROCHLORIDE 14 ML: 5 INJECTION, SOLUTION EPIDURAL; INFILTRATION; PERINEURAL at 10:39

## 2025-01-20 NOTE — PROGRESS NOTES
1/20/25  10:10 AM        NDC: 58797-547-60   -   Ropivacaine 0.5 %    LOT: [id number]    COMMENT: RIGHT SHOULDER GLENOHUMERAL JOINT CORTISONE INJECTION      NDC: 7852-8079-76   -   Depo Medrol 40mg    LOT: [id number]    COMMENT: RIGHT SHOULDER GLENOHUMERAL JOINT CORTISONE INJECTION         
resulted in good relief of symptoms.  There were no complications. The patient was advised to ice the shoulder this evening and to avoid vigorous activities for the next 2 days.  They were advised to call us if there was any erythema, enduration, swelling or increasing pain.    A post-injection information sheet was provided to the patient today following her injection.      I will follow up with her in 6 weeks as needed for recurrent right shoulder pain.      We recommend that She continuing with physical therapy and home exercise program for advanced progression of motion, dynamic loading, and trunk/hip/core/thigh strengthening.     All the patient's questions were answered while in the clinic.  The patient is understanding of all instructions and agrees with the plan.        Sincerely,    I, Ara Almeida ATC, am scribing for and in the presence of, Dr. Javier Degroot MD.  01/20/25 10:26 AM Ara Almeida ATC.      The physical examination was performed between the patient and Dr. Javier Degroot MD.  All counseling during the appointment was performed between the patient and the provider.      Sincerely,    Javier Degroot MD [facility]  Orthopaedic Surgeon - Hip Preservation & Sports Medicine   [facility]   [address] [place] [address], [address], [address]  Office: [phone]    01/20/25  1:27 PM    The encounter with Ally Chandler was carried out by myself, Dr Degroot, who personally examined the patient and reviewed the plan.      This dictation was performed with a verbal recognition program (DRAGON) and it was checked for errors.  It is possible that there are still dictated errors within this office note.  If so, please bring any errors to my attention for an addendum.  All efforts were made to ensure that this office note is accurate.

## 2025-01-29 ENCOUNTER — PATIENT MESSAGE (OUTPATIENT)
Dept: FAMILY MEDICINE CLINIC | Age: 55
End: 2025-01-29

## 2025-01-29 NOTE — TELEPHONE ENCOUNTER
Patient scheduled.    Recent Visits  Date Type Provider Dept   10/03/24 Office Visit Olga Blake MD [facility]   09/05/24 Office Visit Olga Blake MD [facility]   11/03/23 Office Visit Hawa Villegas MD [facility]   10/30/23 Office Visit Olga Blake MD [facility]   09/21/23 Office Visit Olga Blake MD [facility]   Showing recent visits within past 540 days with a meds authorizing provider and meeting all other requirements  Future Appointments  Date Type Provider Dept   01/30/25 Appointment Olga Blake MD [facility]   Showing future appointments within next 150 days with a meds authorizing provider and meeting all other requirements

## 2025-01-30 ENCOUNTER — OFFICE VISIT (OUTPATIENT)
Dept: FAMILY MEDICINE CLINIC | Age: 55
End: 2025-01-30
Payer: COMMERCIAL

## 2025-01-30 VITALS
WEIGHT: 170.5 LBS | TEMPERATURE: 98.1 F | OXYGEN SATURATION: 96 % | HEART RATE: 92 BPM | HEIGHT: 65 IN | SYSTOLIC BLOOD PRESSURE: 116 MMHG | DIASTOLIC BLOOD PRESSURE: 81 MMHG | BODY MASS INDEX: 28.41 KG/M2

## 2025-01-30 DIAGNOSIS — J10.1 INFLUENZA B: Primary | ICD-10-CM

## 2025-01-30 LAB
INFLUENZA A ANTIGEN, POC: NEGATIVE
INFLUENZA B ANTIGEN, POC: POSITIVE
LOT EXPIRE DATE: ABNORMAL
LOT KIT NUMBER: ABNORMAL
S PYO AG THROAT QL: NORMAL
SARS-COV-2, POC: ABNORMAL
VALID INTERNAL CONTROL: ABNORMAL
VENDOR AND KIT NAME POC: ABNORMAL

## 2025-01-30 PROCEDURE — 99213 OFFICE O/P EST LOW 20 MIN: CPT | Performed by: FAMILY MEDICINE

## 2025-01-30 PROCEDURE — 87880 STREP A ASSAY W/OPTIC: CPT | Performed by: FAMILY MEDICINE

## 2025-01-30 PROCEDURE — 87428 SARSCOV & INF VIR A&B AG IA: CPT | Performed by: FAMILY MEDICINE

## 2025-01-30 RX ORDER — OSELTAMIVIR PHOSPHATE 75 MG/1
75 CAPSULE ORAL 2 TIMES DAILY
Qty: 10 CAPSULE | Refills: 0 | Status: SHIPPED | OUTPATIENT
Start: 2025-01-30 | End: 2025-02-04

## 2025-01-30 SDOH — ECONOMIC STABILITY: FOOD INSECURITY: WITHIN THE PAST 12 MONTHS, YOU WORRIED THAT YOUR FOOD WOULD RUN OUT BEFORE YOU GOT MONEY TO BUY MORE.: NEVER TRUE

## 2025-01-30 SDOH — ECONOMIC STABILITY: FOOD INSECURITY: WITHIN THE PAST 12 MONTHS, THE FOOD YOU BOUGHT JUST DIDN'T LAST AND YOU DIDN'T HAVE MONEY TO GET MORE.: NEVER TRUE

## 2025-01-30 ASSESSMENT — PATIENT HEALTH QUESTIONNAIRE - PHQ9
SUM OF ALL RESPONSES TO PHQ QUESTIONS 1-9: 0
SUM OF ALL RESPONSES TO PHQ QUESTIONS 1-9: 0
SUM OF ALL RESPONSES TO PHQ9 QUESTIONS 1 & 2: 0
2. FEELING DOWN, DEPRESSED OR HOPELESS: NOT AT ALL
1. LITTLE INTEREST OR PLEASURE IN DOING THINGS: NOT AT ALL
SUM OF ALL RESPONSES TO PHQ QUESTIONS 1-9: 0
SUM OF ALL RESPONSES TO PHQ QUESTIONS 1-9: 0

## 2025-01-31 NOTE — PROGRESS NOTES
Chief Complaint: Cough, Fever, and Pharyngitis       HPI:  Ally Chandler is a 54 y.o. female here with c/o sore throat and congestion ongoing since 2 days.  She feels extremely tired.  She has been having intermittent fevers and chills.  This year she is getting recurrent infection and is wondering if there is anything she is doing wrong.    ROS:  Constitutional: Negative   HENT: As mentioned above  Respiratory: As mentioned above  Cardiovascular: Negative  Gastrointestinal: Negative  Genitourinary: Negative   Musculoskeletal: Negative      Patient's problem list, medications, allergies, past medical, surgical, social and family histories were reviewed and updated as appropriate.     Current Outpatient Medications   Medication Sig Dispense Refill    oseltamivir (TAMIFLU) 75 MG capsule Take 1 capsule by mouth 2 times daily for 5 days 10 capsule 0    cetirizine (ZYRTEC) 10 MG tablet Take 1 tablet by mouth daily      ferrous sulfate (IRON 325) 325 (65 Fe) MG tablet Take 1 tablet by mouth daily       No current facility-administered medications for this visit.       Social History     Tobacco Use    Smoking status: Never    Smokeless tobacco: Never   Substance Use Topics    Alcohol use: Never        Objective:     Vitals:    01/30/25 1341   BP: 116/81   Pulse: 92   Temp: 98.1 °F (36.7 °C)   TempSrc: Temporal   SpO2: 96%   Weight: 77.3 kg (170 lb 8 oz)   Height: 1.651 m (5' 5\")     Body mass index is 28.37 kg/m².     Wt Readings from Last 3 Encounters:   01/30/25 77.3 kg (170 lb 8 oz)   01/20/25 77.1 kg (170 lb)   01/15/25 77.1 kg (170 lb)     BP Readings from Last 3 Encounters:   01/30/25 116/81   10/03/24 112/71   09/05/24 112/73       Physical exam:  Constitutional: she is oriented to person, place, and time.she appears well-developed and well-nourished. No distress.   HENT:   Head: Normocephalic.   Right Ear: External ear normal. Normal TM   Left Ear: External ear normal. Normal TM  Nose: Nose normal.

## 2025-03-17 ENCOUNTER — TELEPHONE (OUTPATIENT)
Dept: FAMILY MEDICINE CLINIC | Age: 55
End: 2025-03-17

## 2025-04-16 ENCOUNTER — OFFICE VISIT (OUTPATIENT)
Dept: ORTHOPEDIC SURGERY | Age: 55
End: 2025-04-16

## 2025-04-16 VITALS — WEIGHT: 170 LBS | BODY MASS INDEX: 28.32 KG/M2 | HEIGHT: 65 IN

## 2025-04-16 DIAGNOSIS — M75.101 TEAR OF RIGHT SUPRASPINATUS TENDON: Primary | ICD-10-CM

## 2025-04-16 DIAGNOSIS — M75.51 BURSITIS OF RIGHT SHOULDER: ICD-10-CM

## 2025-04-16 DIAGNOSIS — M75.21 BICEPS TENDONITIS ON RIGHT: ICD-10-CM

## 2025-04-16 PROCEDURE — 99213 OFFICE O/P EST LOW 20 MIN: CPT | Performed by: ORTHOPAEDIC SURGERY

## 2025-04-16 RX ORDER — NAPROXEN 500 MG/1
500 TABLET ORAL 2 TIMES DAILY WITH MEALS
Qty: 60 TABLET | Refills: 0 | Status: SHIPPED | OUTPATIENT
Start: 2025-04-16 | End: 2025-05-16

## 2025-04-16 NOTE — PROGRESS NOTES
Chief Complaint    Shoulder Pain (RIGHT SHOULDER)      History of Present Illness:  History of Present Illness    Ally Chandler is a pleasant, 54 y.o., female, here today for follow up of right shoulder pain. Had temporary relief to a steroid injection. Prior  MRI with focal cuff tear.  She reports no new injuries or setbacks. Now considering surgery.     Medical History:  Patient's medications, allergies, past medical, surgical, social and family histories were reviewed and updated as appropriate.    No notes on file    Review of Systems  A 14 point review of systems was completed by the patient  and is available in the media section of the scanned medical record and was reviewed on 4/16/2025.  The review is negative with the exception of those things mentioned in the HPI and Past Medical History    Vital Signs:  There were no vitals filed for this visit.    General/Appearance: Alert and oriented and in no apparent distress.    Skin:  There are no skin lesions, cellulitis, or extreme edema. The patient has warm and well-perfused Bilateral upper extremities with brisk capillary refill.      right Shoulder Exam:  Physical Exam    Inspection:   No gross deformities, no signs of infection.    Palpation: Tenderness over supraspinatus    Active Range of Motion:  Forward Elevation 90, Abduction 90, External Rotation 30, Internal Rotation T12    Passive Range of Motion:  SAME, Forward Elevation  120,      Strength:  External Rotation  4/5, Internal Rotation 5/5, Supraspinatus 4/5, Champagne Toast 4/5    Special Tests:    +SANTO No Vinny muscle deformity.    Neurovascular: Sensation to light touch is intact, no motor deficits, palpable radial pulses 2+      Radiology:     Results           No new XR obtained at this time.           Impression:  Encounter Diagnoses   Name Primary?    Tear of right supraspinatus tendon Yes    Bursitis of right shoulder     Biceps tendonitis on right        Office Procedures:  Orders

## 2025-04-30 ENCOUNTER — TELEPHONE (OUTPATIENT)
Dept: ORTHOPEDIC SURGERY | Age: 55
End: 2025-04-30

## 2025-05-17 NOTE — TELEPHONE ENCOUNTER
General Question     Subject: LT SHOULDER PAIN  Patient and /or Facility Request: Ally hCandler   Contact Number: [phone]     PATIENT CALLED WANTING TO KNOW IF A MRI ORDER CAN BE SENT OVER FOR HER LT SHOULDER OR IF SHE CAN GET A ULTRA SOUND DONE    PLEASE CALL PATIENT BACK AT THE ABOVE NUMBER     I will STOP taking the medications listed below when I get home from the hospital:  None